# Patient Record
Sex: FEMALE | Race: WHITE | NOT HISPANIC OR LATINO | Employment: FULL TIME | URBAN - METROPOLITAN AREA
[De-identification: names, ages, dates, MRNs, and addresses within clinical notes are randomized per-mention and may not be internally consistent; named-entity substitution may affect disease eponyms.]

---

## 2017-02-03 ENCOUNTER — OFFICE VISIT - RIVER FALLS (OUTPATIENT)
Dept: FAMILY MEDICINE | Facility: CLINIC | Age: 52
End: 2017-02-03

## 2017-02-03 ASSESSMENT — MIFFLIN-ST. JEOR: SCORE: 1300.31

## 2017-07-10 ENCOUNTER — OFFICE VISIT - RIVER FALLS (OUTPATIENT)
Dept: FAMILY MEDICINE | Facility: CLINIC | Age: 52
End: 2017-07-10

## 2017-07-10 ASSESSMENT — MIFFLIN-ST. JEOR: SCORE: 1294.52

## 2017-07-11 LAB
CREAT SERPL-MCNC: 0.89 MG/DL (ref 0.5–1.05)
GLUCOSE BLD-MCNC: 80 MG/DL (ref 65–99)

## 2017-12-12 ENCOUNTER — RECORDS - HEALTHEAST (OUTPATIENT)
Dept: ADMINISTRATIVE | Facility: OTHER | Age: 52
End: 2017-12-12

## 2017-12-18 ENCOUNTER — HOSPITAL ENCOUNTER (OUTPATIENT)
Dept: RADIOLOGY | Facility: CLINIC | Age: 52
Discharge: HOME | End: 2017-12-18
Attending: COLON & RECTAL SURGERY

## 2017-12-18 DIAGNOSIS — K59.00 CONSTIPATION: ICD-10-CM

## 2017-12-20 ENCOUNTER — HOSPITAL ENCOUNTER (OUTPATIENT)
Dept: RADIOLOGY | Facility: CLINIC | Age: 52
Discharge: HOME | End: 2017-12-20
Attending: COLON & RECTAL SURGERY

## 2017-12-20 DIAGNOSIS — K59.00 CONSTIPATION: ICD-10-CM

## 2017-12-22 ENCOUNTER — HOSPITAL ENCOUNTER (OUTPATIENT)
Dept: RADIOLOGY | Facility: CLINIC | Age: 52
Discharge: HOME | End: 2017-12-22
Attending: COLON & RECTAL SURGERY

## 2017-12-22 DIAGNOSIS — K59.00 CONSTIPATION: ICD-10-CM

## 2018-07-31 ENCOUNTER — OFFICE VISIT - RIVER FALLS (OUTPATIENT)
Dept: FAMILY MEDICINE | Facility: CLINIC | Age: 53
End: 2018-07-31

## 2018-08-07 ENCOUNTER — OFFICE VISIT - RIVER FALLS (OUTPATIENT)
Dept: FAMILY MEDICINE | Facility: CLINIC | Age: 53
End: 2018-08-07

## 2018-08-07 ASSESSMENT — MIFFLIN-ST. JEOR: SCORE: 1309.95

## 2018-11-12 ENCOUNTER — TRANSFERRED RECORDS (OUTPATIENT)
Dept: HEALTH INFORMATION MANAGEMENT | Facility: CLINIC | Age: 53
End: 2018-11-12

## 2018-12-18 ENCOUNTER — OFFICE VISIT - RIVER FALLS (OUTPATIENT)
Dept: FAMILY MEDICINE | Facility: CLINIC | Age: 53
End: 2018-12-18

## 2018-12-18 ASSESSMENT — MIFFLIN-ST. JEOR: SCORE: 1330.81

## 2019-07-30 ENCOUNTER — TRANSFERRED RECORDS (OUTPATIENT)
Dept: HEALTH INFORMATION MANAGEMENT | Facility: CLINIC | Age: 54
End: 2019-07-30

## 2020-08-10 ENCOUNTER — TRANSFERRED RECORDS (OUTPATIENT)
Dept: HEALTH INFORMATION MANAGEMENT | Facility: CLINIC | Age: 55
End: 2020-08-10

## 2020-11-14 ENCOUNTER — TRANSFERRED RECORDS (OUTPATIENT)
Dept: HEALTH INFORMATION MANAGEMENT | Facility: CLINIC | Age: 55
End: 2020-11-14

## 2021-07-02 ENCOUNTER — TRANSFERRED RECORDS (OUTPATIENT)
Dept: HEALTH INFORMATION MANAGEMENT | Facility: CLINIC | Age: 56
End: 2021-07-02

## 2021-07-12 ENCOUNTER — TRANSFERRED RECORDS (OUTPATIENT)
Dept: HEALTH INFORMATION MANAGEMENT | Facility: CLINIC | Age: 56
End: 2021-07-12

## 2021-07-26 ENCOUNTER — TRANSFERRED RECORDS (OUTPATIENT)
Dept: HEALTH INFORMATION MANAGEMENT | Facility: CLINIC | Age: 56
End: 2021-07-26

## 2021-11-23 ENCOUNTER — TRANSFERRED RECORDS (OUTPATIENT)
Dept: HEALTH INFORMATION MANAGEMENT | Facility: CLINIC | Age: 56
End: 2021-11-23

## 2021-12-07 ENCOUNTER — TRANSFERRED RECORDS (OUTPATIENT)
Dept: HEALTH INFORMATION MANAGEMENT | Facility: CLINIC | Age: 56
End: 2021-12-07

## 2022-02-11 VITALS — BODY MASS INDEX: 28.95 KG/M2 | TEMPERATURE: 98.3 F | WEIGHT: 163.4 LBS | HEIGHT: 63 IN

## 2022-02-12 VITALS
HEIGHT: 63 IN | SYSTOLIC BLOOD PRESSURE: 116 MMHG | DIASTOLIC BLOOD PRESSURE: 70 MMHG | BODY MASS INDEX: 28.88 KG/M2 | HEART RATE: 96 BPM | OXYGEN SATURATION: 97 % | WEIGHT: 163 LBS | TEMPERATURE: 98.2 F

## 2022-02-12 VITALS
HEART RATE: 66 BPM | DIASTOLIC BLOOD PRESSURE: 60 MMHG | TEMPERATURE: 98.5 F | SYSTOLIC BLOOD PRESSURE: 108 MMHG | WEIGHT: 166.4 LBS | BODY MASS INDEX: 29.48 KG/M2 | HEIGHT: 63 IN

## 2022-02-12 VITALS
BODY MASS INDEX: 30.3 KG/M2 | HEART RATE: 68 BPM | SYSTOLIC BLOOD PRESSURE: 110 MMHG | WEIGHT: 171 LBS | DIASTOLIC BLOOD PRESSURE: 62 MMHG | HEIGHT: 63 IN | TEMPERATURE: 98.3 F

## 2022-02-16 NOTE — TELEPHONE ENCOUNTER
Entered by Ramonita Rosas CMA on October 01, 2019 1:27:14 PM CDT  ---------------------  From: Ramonita Rosas CMA   To: [facility] #[id number]    Sent: 10/1/2019 1:27:14 PM CDT  Subject: Medication Management     ** Not Approved: Patient needs appointment **  zolpidem (ZOLPIDEM 10MG TABLETS)  TAKE 1 TABLET BY MOUTH AT BEDTIME  Qty:  30 tab(s)        Days Supply:  30        Refills:  0          Substitutions Allowed     Route To Pharmacy - [facility] #[id number]   Signed by Ramonita Rosas CMA            ------------------------------------------  From: [employer] #[id number]  To: Aaron Hernandez MD  Sent: October 1, 2019 1:11:06 PM CDT  Subject: Medication Management  Due: October 2, 2019 1:11:06 PM CDT    ** On Hold Pending Signature **  Drug: zolpidem (zolpidem 10 mg oral tablet)  1 TAB(S) ORAL HS  Quantity: 30 tab(s)     Days Supply: 0         Refills: 0  Substitutions Allowed  Notes from Pharmacy:     Dispensed Drug: zolpidem (zolpidem 10 mg oral tablet)  TAKE 1 TABLET BY MOUTH AT BEDTIME  Quantity: 30 tab(s)     Days Supply: 30        Refills: 0  Substitutions Allowed  Notes from Pharmacy:   ------------------------------------------Med Refill    Date of last office visit and reason:  12/2018      Date of last Med Check / Px:   12/2018  Date of last labs pertaining to med:  n/a    RTC order in chart:  yes; pt was to f/u in July when she was given the last 30 day supply. She is living in [place]. Will need an apt for more refills.     For Protocol refill, has patient been contacted:  n/a

## 2022-02-16 NOTE — TELEPHONE ENCOUNTER
---------------------  From: Ghada Shirley CMA (Phone Messages [employer] ([id number])   To: Aaron Hernandez MD;     Sent: 2/19/2019 3:05:11 PM CST  Subject: med request     Phone Message    PCP:    MARY      Time of Call:  2:48pm       Person Calling:  Yolande  Phone number:  [phone]    Returned call at: 2:53pm    Note:  Patient called asking for med refills on Zolpidem and Cyclobenzaprine. Both are PRN medications, she is not currently out of them but patient has moved to [place] and would like remaining refills on file. Please send.     Pt aware [facility] out of clinic until Monday 2/25/19.    Last office visit and reason:  12/18/18 med refillsPlease find out what pharmacy to send them to. Thanks---------------------  From: Aaron Hernandez MD   To: [employer] ([id number]);     Sent: 2/25/2019 9:11:24 AM CST  Subject: RE: med requestLMTCB---------------------  From: Ramonita Rosas CMA ([employer] ([id number]))   To: Aaron Hernandez MD;     Sent: 2/27/2019 8:43:28 AM CST  Subject: RE: med request     Returned Call  Time: 8:40 am  Note:  Called & spoke with pt and she confirmed that the pharmacy listed in her chart is correct. Please advise on refills.  ** Submitted: **  Order:cyclobenzaprine (cyclobenzaprine 10 mg oral tablet)  See Instructions  Take one tablet by mouth three times daily as needed  for muscle spasm  Qty:  30 tab(s)        Refills:  4          [id number]    Signed by Aaron Hernandez MD  2/27/2019 8:44:00 AM    ** Submitted: **  Order:zolpidem (zolpidem 10 mg oral tablet)  1 tab(s)  po  hs  Qty:  90 tab(s)        Refills:  1          Route To Pharmacy - [id number]    Signed by Aaron Hernandez MD  2/27/2019 8:44:00 AM---------------------  From: Aaron Hernandez MD   To: [facility] [employer] ([id number]);     Sent: 2/27/2019 8:45:28 AM CST  Subject: RE: med requestnoted.
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
## 2022-02-16 NOTE — TELEPHONE ENCOUNTER
Entered by Keiry Lagos MA on April 08, 2019 3:17:56 PM CDT  ---------------------  From: Keiry Lagos MA   To: [id number]    Sent: 4/8/2019 3:17:56 PM CDT  Subject: Medication Management     ** Not Approved: Patient should contact Prescriber first **  cyclobenzaprine (CYCLOBENZAPRINE 10MG TABLETS)  TAKE ONE TABLET BY MOUTH THREE TIMES DAILY AS NEEDED FOR MUSCLE SPASM  Qty:  30 tab(s)        Days Supply:  10        Refills:  0          Substitutions Allowed     Route To Pharmacy - [id number]   Signed by Keiry Lagos MA              ** Patient matched by Keiry Lagos MA on 4/8/2019 9:13:38 AM CDT **     ------------------------------------------  From: [id number]  To: Aaron Hernandez MD  Sent: April 7, 2019 12:02:34 PM CDT  Subject: Medication Management  Due: April 8, 2019 12:02:34 PM CDT    ** On Hold Pending Signature **  Drug: cyclobenzaprine (cyclobenzaprine 10 mg oral tablet)  TAKE ONE TABLET BY MOUTH THREE TIMES DAILY AS NEEDED FOR MUSCLE SPASM  Quantity: 30 tab(s)     Days Supply: 10        Refills: 0  Substitutions Allowed  Notes from Pharmacy:     Dispensed Drug: cyclobenzaprine (cyclobenzaprine 10 mg oral tablet)  TAKE ONE TABLET BY MOUTH THREE TIMES DAILY AS NEEDED FOR MUSCLE SPASM  Quantity: 30 tab(s)     Days Supply: 10        Refills: 0  Substitutions Allowed  Notes from Pharmacy:   ------------------------------------------Called and left message asking patient to call us back. She now lives in [place].

## 2022-02-16 NOTE — PROGRESS NOTES
Patient:   CULLEN NEGRO            MRN: [id number]            FIN: [id number]               Age:   51 years     Sex:  Female     :  1965   Associated Diagnoses:   None   Author:   Gerardo Moreno MD      Chief Complaint   2/3/2017 1:43 PM CST     pt here for chronic sinusitis, not improving on antibiotics, has been on two z-paks and now is on Augmentin for 14 days, feels some improvement yet feels not completely gone and antibiotics are getting hard on her stomach        History of Present Illness   Asked to see by Dr. Hernandez for evalaution of sinusitis.  Patient reports that she has been on meds for 38 days.  Her symptoms are better but it is hard on her stomach.  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  Headache on the right side.  Pain in upper back teeth.  Constant nasal drainage and stuffy nose.  Headaches aren't as bad on the antibiotics.  She doesn't feel good.        Review of Systems   Constitutional:  Negative.    Ear/Nose/Mouth/Throat:  Negative except as documented in history of present illness.    Respiratory:  Negative.       Health Status   Allergies:    Allergic Reactions (Selected)  Severity Not Documented  Avelox (Headache)  Levaquin (Nausea and vomiting)  TraMADol (Nausea, headaches)  Vicodin (Itching)   Medications:  (Selected)   Prescriptions  Prescribed  Alphaquin HP 4% topical cream: 1 silver, top, bid, PRN: as needed for skin, # 30 gm, 5 Refill(s), Type: Maintenance, [id number], 1 silver top bid  Augmentin 875 mg oral tablet: 1 tab(s), PO, q12hr, # 28 tab(s), 0 Refill(s), Type: Maintenance, Pharmacy: [facility] #[id number], 1 tab(s) po q12 hrs,x14 day(s)  Cymbalta 60 mg oral delayed release capsule: 1 cap(s) ( 60 mg ), po, bid, # 60 tab(s), 5 Refill(s), Pharmacy: [facility] #[id number], 1 cap(s) po bid  cyclobenzaprine 10 mg oral tablet: See Instructions, Instructions: TAKE 1 TABLET BY MOUTH 3 TIMES DAILY AS NEEDED FOR SPASM, # 30 tab(s), 1 Refill(s), Type: Soft Stop,  Pharmacy: [facility] #[id number]  estradiol 2 mg oral tablet: 1 tab(s) ( 2 mg ), PO, Daily, # 90 tab(s), 3 Refill(s), Type: Maintenance, Pharmacy: [facility] #[id number], 1 tab(s) po daily  oxycodone 5 mg oral tablet: 1 tab(s) ( 5 mg ), PO, q4 hrs, PRN: for pain, # 40 tab(s), 0 Refill(s), Type: Maintenance  zolpidem 10 mg oral tablet: 1 tab(s) ( 10 mg ), PO, Once a day (at bedtime), PRN: for sleep, # 30 tab(s), 5 Refill(s), Type: Maintenance, called to pharmacy (Rx)  Documented Medications  Documented  Zantac: See Instructions, PRN: for control of stomach acid, 0 Refill(s), Type: Maintenance  multivitamin additive: daily, Instructions: Centrum, tab(s), 0 Refill(s), Type: Maintenance   Problem list:    All Problems (Selected)  Backache / SNOMED CT 042302763 / Confirmed  Chronic sinusitis / SNOMED CT 33063393 / Confirmed  Eating disorder / SNOMED CT 128496966 / Confirmed  Anxiety Disorder / SNOMED CT 90812809 / Confirmed  HPV in female / SNOMED CT 833263038 / Confirmed  Tobacco Use Disorder, Continuous / ICD-9-.1 / Confirmed      Histories   Past Medical History:    Active  Backache (514486828): Onset on 7/28/2008 at 42 years.  Comments:  1/16/2014 CST 11:56 AM CST - Yolande Kline.  Anxiety Disorder (99101185)  Tobacco Use Disorder, Continuous (305.1)  HPV in female (591614131)  Eating disorder (140207640)  Chronic sinusitis (74294915)  Resolved  Uterine leiomyoma (919946646): Onset in 1995 at 29 years.  Resolved.   Family History:    Diabetes mellitus  Mother  [place] - Cancer  Father     Procedure history:    Laparoscopy with lysis of adhesions (07633987) on 1/22/2014 at 48 Years.  Comments:  11/21/2014 2:27 PM - Ela Zayas  Bilateral salpingoophorectomy.  Hysterectomy (012779732) in 1995 at 30 Years.  Childbirth (2387236615).  Comments:  10/1/2010 11:21 AM - Bernie Harmon  Para 3      Physical Examination   Vital Signs   2/3/2017 1:43 PM CST     Temperature Tympanic      98.3 DegF     Measurements from  flowsheet : Measurements   2/3/2017 1:43 PM CST Height Measured - Standard 63 in    Weight Measured - Standard 163.4 lb    BSA 1.81 m2    Body Mass Index 28.94 kg/m2      CONSTITUTIONAL  General Appearance:  Normal, well developed, well nourished, no obvious distress  Ability to Communicate:  communicates appropriately.    HEAD AND FACE  Appearance and Symmetry:  Normal, no scalp or facial scarring or suspicious lesions.  Paranasal sinuses tenderness:  Normal, Paranasal sinuses non tender    EARS  Clinical speech reception threshold:  Normal, able to hear normal speech.  Auricle:  Normal, Auricles without scars, lesions, masses.  External auditory canal:  Normal, External auditory canal normal.  Tympanic membrane:  Normal, Tympanic membranes normal without swelling or erythema.  Tympanic membrane mobility:  Normal, Normal tympanic membrane mobility.    NOSE (speculum or scope)  Architecture:  Normal, Grossly normal external nasal architecture with no masses or lesions.  Mucosa:  Normal mucosa, No polyps or masses.  Septum:  Normal, Septum non-obstructing.  Turbinates:  Normal, No turbinate abnormalities    ORAL CAVITY AND OROPHARYNX  Lips:  Normal.  Dental and gingiva:  Normal, No obvious dental or gingival disease.  Mucosa:  Normal, Moist mucous membranes.  Tongue:  Normal, Tongue mobile with no mucosal abnormalities  Hard and soft palate:  Normal, Hard and soft palate without cleft or mucosal lesions.  Oral pharynx:  Normal, Posterior pharynx without lesions or remarkable asymmetry.  Saliva:  Normal, Clear saliva.  Masses:  Normal, No palpable masses or pathologically enlarged lymph nodes.    NECK  Masses/lymph nodes:  Normal, No worrisome neck masses or lymph nodes.  Salivary glands:  Normal, Parotid and submandibular glands.  Trachea and larynx position:  Normal, Trachea and larynx midline.  Thyroid:  Normal, No thyroid abnormality.  Tenderness:  Normal, No cervical tenderness.  Suppleness:  Normal, Neck  supple    NEUROLOGICAL  Speech pattern:  Normal, Proasaic    RESPIRATORY  Symmetry and Respiratory effort:  Normal, Symmetric chest movement and expansion with no increased intercostal retractions or use of accessory muscles.     CT SINUS  I reviewed the images with her and it shows some membrane thickening in the anterior ethmoids and mild air fluid level in the left maxillary sinus.        Impression and Plan   Recurrent acute pansinusitis.  It appears to be resolved.  I discussed endoscopic sinus surgery.  She is not interested in this at this time.  I advised the she use fluticasone to help reduce inflammaion and reduce the likelihood of getting an infection.

## 2022-02-16 NOTE — PROGRESS NOTES
Patient:   CULLEN NEGRO            MRN: [id number]            FIN: [id number]               Age:   51 years     Sex:  Female     :  1965   Associated Diagnoses:   Well adult exam; Blood in stool; Fatigue   Author:   Aaron Hernandez MD      Visit Information   Visit type:  Annual exam.    Source of history:  Self.    History limitation:  None.       Chief Complaint   7/10/2017 4:09 PM CDT    Patient here for physical. Patient has multiple concerns including GI upset, changes with stool, blood in stool, abdominal pain.      Well Adult History   Well Adult History             The patient presents for well adult exam.  The patient's general health status is described as fair.  Patient has been having increased issues with her bowels again. Noting more diarrhea. Intermittent blood in stool. No fevers. Feels bloated and uncomfortable. Poor appetite. Reviewed past workup. Was referred to gastroenterology in . Had workup including EGD, colonoscopy, and labs. Testing were all normal. Had some luck with changing diet but that is not working anymore. Patient reports that she has discovered that Crohns disease runs in her family. Worried about possible causes for symptoms. Frustrated that symptoms have been diffcult to control. .  The patient's diet is described as balanced and changing diet not helping anymore, still working at diet.  Associated symptoms consist of none.  Last menstrual period: patient no longer menstruates due to hysterectomy.  Medical encounters: none.        Review of Systems   ROS reviewed as documented in chart      Health Status   Allergies:    Allergic Reactions (Selected)  Severity Not Documented  Avelox (Headache)  Levaquin (Nausea and vomiting)  TraMADol (Nausea, headaches)  Vicodin (Itching)   Medications:  (Selected)   Prescriptions  Prescribed  Alphaquin HP 4% topical cream: 1 silver, top, bid, PRN: as needed for skin, # 30 gm, 5 Refill(s), Type: Maintenance, Pharmacy: [facility]  #[id number], 1 silver top bid  Augmentin 875 mg oral tablet: 1 tab(s), PO, q12hr, # 28 tab(s), 0 Refill(s), Type: Maintenance, Pharmacy: [facility] #[id number], 1 tab(s) po q12 hrs,x14 day(s)  DULoxetine 60 mg oral delayed release capsule: 1 cap(s) ( 60 mg ), po, bid, # 60 cap(s), 0 Refill(s), Type: Maintenance, Pharmacy: [facility] #[id number], Pt needs appt for further refills  cyclobenzaprine 10 mg oral tablet: See Instructions, Instructions: TAKE 1 TABLET BY MOUTH 3 TIMES DAILY AS NEEDED FOR SPASM, # 30 tab(s), 2 Refill(s), Type: Soft Stop, Pharmacy: [facility] #[id number]  estradiol 2 mg oral tablet: 1 tab(s) ( 2 mg ), PO, Daily, # 90 tab(s), 3 Refill(s), Type: Maintenance, Pharmacy: [facility] #[id number], 1 tab(s) po daily  fluticasone 50 mcg/inh nasal spray: 2 spray(s), nasal, daily, # 1 EA, 11 Refill(s), Type: Maintenance, Pharmacy: [facility] #[id number], 2 spray(s) nasal daily,x30 day(s)  oxycodone 5 mg oral tablet: 1 tab(s) ( 5 mg ), PO, q4 hrs, PRN: for pain, # 40 tab(s), 0 Refill(s), Type: Maintenance  zolpidem 10 mg oral tablet: See Instructions, Instructions: TAKE ONE TABLET NIGHTLY AT BEDTIME AS NEEDED FOR SLEEP, # 30 tab(s), 4 Refill(s), Type: Soft Stop, Pharmacy: [facility] #[id number], TAKE ONE TABLET NIGHTLY AT BEDTIME AS NEEDED FOR SLEEP  Documented Medications  Documented  NexIUM: po, daily, 0 Refill(s), Type: Maintenance  Zantac: See Instructions, PRN: for control of stomach acid, 0 Refill(s), Type: Maintenance  multivitamin additive: daily, Instructions: Centrum, tab(s), 0 Refill(s), Type: Maintenance   Problem list:    All Problems  Backache / SNOMED CT 873743237 / Confirmed  Chronic sinusitis / SNOMED CT 55106216 / Confirmed  Eating disorder / SNOMED CT 586235758 / Confirmed  Anxiety Disorder / SNOMED CT 28809523 / Confirmed  HPV in female / SNOMED CT 008900053 / Confirmed  Tobacco Use Disorder, Continuous / ICD-9-.1 / Confirmed  Resolved: Uterine leiomyoma / SNOMED CT 953924487      Histories    Past Medical History:    Active  Backache (SNOMED CT 603607789): Onset on 7/28/2008 at 42 years.  Comments:  1/16/2014 CST 11:56 AM CST - Yolande Kline.  Anxiety Disorder (SNOMED CT 51382577)  Tobacco Use Disorder, Continuous (ICD-9-.1)  HPV in female (SNOMED CT 849452084)  Eating disorder (SNOMED CT 274577643)  Chronic sinusitis (SNOMED CT 34104909)  Resolved  Uterine leiomyoma (SNOMED CT 269789771): Onset in 1995 at 29 years.  Resolved.   Family History:    Diabetes mellitus  Mother  [place] - Cancer  Father     Procedure history:    Laparoscopy with lysis of adhesions (31042410) on 1/22/2014 at 48 Years.  Comments:  11/21/2014 2:27 PM - Ela Zayas  Bilateral salpingoophorectomy.  Hysterectomy (819872335) in 1995 at 30 Years.  Childbirth (0843719208).  Comments:  10/1/2010 11:21 AM - Bernie Harmon  Para 3   Social History:        Tobacco Assessment: Past            Cigarettes, 10 per day.  20 year(s).  Ready to change: Yes.            Past      Substance Abuse Assessment: Denies Substance Abuse            Never      Employment and Education Assessment            Health Aide      Home and Environment Assessment            Marital status: .  Spouse/Partner name: Fan Hatch.      Nutrition and Health Assessment            Type of diet: Regular.      Exercise and Physical Activity Assessment: Does not exercise            Exercise frequency: ..      Sexual Assessment            Sexually active: Yes.  Sexual orientation: Heterosexual.  Other contraceptive use: Hysterectomy.      Other Assessment            Immunization, Paper chart states patient completed the Hepatitis B series            No history of abnormal Pap smear.        Physical Examination   Vital Signs   7/10/2017 4:09 PM CDT Temperature Temporal 98.2 DegF    Peripheral Pulse Rate 96 bpm    Systolic Blood Pressure 116 mmHg    Diastolic Blood Pressure 70 mmHg    Mean Arterial Pressure 85 mmHg    Oxygen Saturation 97 %      Measurements  from flowsheet : Measurements   7/10/2017 4:09 PM CDT Height Measured - Standard 62.75 in    Weight Measured - Standard 163.0 lb    BSA 1.81 m2    Body Mass Index 29.1 kg/m2      General:  Alert and oriented, No acute distress.    Eye:  Pupils are equal, round and reactive to light, Extraocular movements are intact, Normal conjunctiva.    HENT:  Normocephalic, Tympanic membranes are clear, Oral mucosa is moist, No pharyngeal erythema.    Neck:  Supple, Non-tender, No lymphadenopathy, No thyromegaly.    Respiratory:  Lungs are clear to auscultation.    Cardiovascular:  Normal rate, Regular rhythm.    Breast:  No mass, No tenderness, No discharge.    Gastrointestinal:  Soft, Non-tender, Non-distended, Normal bowel sounds, No organomegaly.    Musculoskeletal:  Normal range of motion, Normal strength.    Integumentary:  Warm, Dry, Pink, No rash, no concerning lesions.    Neurologic:  Alert, Oriented, Normal sensory.    Psychiatric:  Cooperative, Appropriate mood & affect.       Review / Management   Results review   Reviewed colonoscopy, EGD, biopsy results, prior labs      Impression and Plan   Diagnosis     Well adult exam (IWL26-BM Z00.00).     Course:  Progressing as expected.    Patient Instructions:       Counseled: Patient, BMI, diet, and exercise.    Diagnosis     Blood in stool (YOR31-BU K92.1).     Fatigue (KDL22-AT R53.83).     Well adult exam (LXZ67-QP Z00.00).     Course:  Worsening.    Orders     Orders (Selected)   Outpatient Orders  Ordered  Referral (Request): 07/10/17 16:38:00 CDT, Referred to: General Surgery, Referred to: Colon and Rectal surgeons, Reason for referral: blood in stool, ongoing abdominal pain, family hx Crohns, Abdominal pain  Blood in stool  Fatigue  Ordered (In Transit)  CBC (includes diff/plt)* ([facility]): Specimen Type: Blood, Collection Date: 07/10/17 16:38:00 CDT  Comprehensive Metabolic Panel* ([facility]): Specimen Type: Serum, Collection Date: 07/10/17 16:38:00 CDT  Sed rate by  modified mt* ([facility]): Specimen Type: Blood, Collection Date: 07/10/17 16:38:00 CDT  Documented Medications  Documented  NexIUM: po, daily, 0 Refill(s), Type: Maintenance.

## 2022-02-16 NOTE — TELEPHONE ENCOUNTER
---------------------  From: Rachel Gaines CMA (Phone Messages Pool ([id number]_[facility]))   Sent: 7/22/2019 9:07:50 AM CDT  Subject: Zolpidem Rx     Phone message    PCP: MARY    Person calling: Raissa  Phone number: [phone] ok LVM  Time message left: 0845  Return call time: _    Reason: Raissa called and stated that MARY was suppose to send a prescription for Ambien to the [facility] in [place], [place]  she stated that she called them but they told her that they do not have any Rx from the clinic. Informed Raissa that I would contact the clinic and see what was going on.     [facility] they stated that they have the Rx and that the cost is $61.99 out of pocket as they  don't have any insurance for her on file, and they cant contact her either as they don't have a phone number on file. Gave the pharmacy pt number and informed them she has [employer] open access for insurance. Pharmacist stated that they will contact pt and take care of everything and will contact clinic if needed.    Transferred to:             DIANNA Gaines CMA

## 2022-02-16 NOTE — LETTER
(Inserted Image. Unable to display)   August 07, 2019      CULLEN NEGRO  [address]   [place], [place] [id number]        Dear CULLEN,      Thank you for selecting [facility] ([facility] for your healthcare needs.     Our records indicate you are due for the following services:     Annual Physical    To schedule an appointment or if you have further questions, please contact your primary clinic:   [facility]          [phone]   [facility]    [phone]             [facility]         [phone]      Powered by [email or web address]    Sincerely,    Aaron Hernandez M.D.
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
## 2022-06-16 NOTE — PROCEDURES
Accession Number:       [id number]  PATHOLOGIST:     Glenn Goyal Jr., M.D., Board Certified in Anatomic Pathology, Clinical Pathology and Cytopathology, 1 [phone] (electronic signature)  REPORT NOTES:     Key portions of this case have been reviewed by a Board Certified Dermatopathologist.  A SOURCE:     Skin, right upper chest  A GROSS DESCRIPTION:     See comment       Specimen is received in formalin, labeled with       multiple patient identifier(s) and consists of       one piece from a punch skin biopsy measuring 0.4       x 0.4 x 0.4 cm, circular in shape and tan-gray in       color. The margins are inked green. The specimen       is bisected and entirely submitted in one       cassette(s).         Gross exam(s) performed at: [address] [address]         : KRISTAL CRISOSTOMO MD  A DIAGNOSIS:     Leiomyoma with mild cytologic atypia, extending to the biopsy margins. See comment.  A COMMENT:     Immunostain with smooth muscle actin is positive and favors the above diagnosis (all positive and required negative controls stained appropriately). In view of the cytologic atypia, complete excision is recommended.

## 2023-02-03 ENCOUNTER — TRANSFERRED RECORDS (OUTPATIENT)
Dept: HEALTH INFORMATION MANAGEMENT | Facility: CLINIC | Age: 58
End: 2023-02-03

## 2023-07-12 ENCOUNTER — TRANSFERRED RECORDS (OUTPATIENT)
Dept: HEALTH INFORMATION MANAGEMENT | Facility: CLINIC | Age: 58
End: 2023-07-12

## 2023-08-17 ENCOUNTER — TRANSFERRED RECORDS (OUTPATIENT)
Dept: HEALTH INFORMATION MANAGEMENT | Facility: CLINIC | Age: 58
End: 2023-08-17

## 2023-09-10 ENCOUNTER — TRANSFERRED RECORDS (OUTPATIENT)
Dept: MULTI SPECIALTY CLINIC | Facility: CLINIC | Age: 58
End: 2023-09-10

## 2023-10-23 ENCOUNTER — TRANSFERRED RECORDS (OUTPATIENT)
Dept: HEALTH INFORMATION MANAGEMENT | Facility: CLINIC | Age: 58
End: 2023-10-23

## 2024-03-14 ENCOUNTER — OFFICE VISIT (OUTPATIENT)
Dept: FAMILY MEDICINE | Facility: CLINIC | Age: 59
End: 2024-03-14

## 2024-03-14 ENCOUNTER — NURSE TRIAGE (OUTPATIENT)
Dept: NURSING | Facility: CLINIC | Age: 59
End: 2024-03-14

## 2024-03-14 VITALS
OXYGEN SATURATION: 97 % | RESPIRATION RATE: 16 BRPM | TEMPERATURE: 99.1 F | DIASTOLIC BLOOD PRESSURE: 78 MMHG | BODY MASS INDEX: 28.28 KG/M2 | HEART RATE: 105 BPM | HEIGHT: 63 IN | SYSTOLIC BLOOD PRESSURE: 136 MMHG | WEIGHT: 159.6 LBS

## 2024-03-14 DIAGNOSIS — H65.91 OME (OTITIS MEDIA WITH EFFUSION), RIGHT: Primary | ICD-10-CM

## 2024-03-14 PROCEDURE — 99213 OFFICE O/P EST LOW 20 MIN: CPT | Performed by: FAMILY MEDICINE

## 2024-03-14 RX ORDER — ESTRADIOL 2 MG/1
2 TABLET ORAL DAILY
COMMUNITY
End: 2024-04-11

## 2024-03-14 RX ORDER — DULOXETIN HYDROCHLORIDE 60 MG/1
60 CAPSULE, DELAYED RELEASE ORAL DAILY
COMMUNITY
End: 2024-04-11

## 2024-03-14 RX ORDER — CEFDINIR 300 MG/1
300 CAPSULE ORAL 2 TIMES DAILY
Qty: 20 CAPSULE | Refills: 0 | Status: SHIPPED | OUTPATIENT
Start: 2024-03-14 | End: 2024-04-11

## 2024-03-14 ASSESSMENT — PATIENT HEALTH QUESTIONNAIRE - PHQ9
SUM OF ALL RESPONSES TO PHQ QUESTIONS 1-9: 17
10. IF YOU CHECKED OFF ANY PROBLEMS, HOW DIFFICULT HAVE THESE PROBLEMS MADE IT FOR YOU TO DO YOUR WORK, TAKE CARE OF THINGS AT HOME, OR GET ALONG WITH OTHER PEOPLE: SOMEWHAT DIFFICULT
SUM OF ALL RESPONSES TO PHQ QUESTIONS 1-9: 17

## 2024-03-14 NOTE — PROGRESS NOTES
"  Assessment & Plan     OME (otitis media with effusion), right  Will treat with antibiotics as ordered, follow-up if not getting better over the next 2 to 3 days.  - cefdinir (OMNICEF) 300 MG capsule; Take 1 capsule (300 mg) by mouth 2 times daily            BMI  Estimated body mass index is 27.86 kg/m  as calculated from the following:    Height as of this encounter: 1.612 m (5' 3.47\").    Weight as of this encounter: 72.4 kg (159 lb 9.6 oz).       Depression Screening Follow Up        3/14/2024     1:00 PM   PHQ   PHQ-9 Total Score 17   Q9: Thoughts of better off dead/self-harm past 2 weeks Not at all           Follow Up Actions Taken  Crisis resource information provided in After Visit Summary  Follow up recommended: Will discuss at her upcoming visit at her request.            Urbano Lanier is a 58 year old, presenting for the following health issues:  Ear Problem (Right Ear - ) and Headache (X 5 days, with sinus pressure. )      3/14/2024     1:05 PM   Additional Questions   Roomed by Donna PADILLA CMA   Accompanied by None     Via the Health Maintenance questionnaire, the patient has reported the following services have been completed -Mammogram, this information has been sent to the abstraction team.  History of Present Illness       Headaches:   Since the patient's last clinic visit, headaches are: worsened  The patient is getting headaches:  1day a week  She is not able to do normal daily activities when she has a migraine.  The patient is taking the following rescue/relief medications:  Tylenol   Patient states \"The relief is inconsistent\" from the rescue/relief medications.   The patient is taking the following medications to prevent migraines:  No medications to prevent migraines  In the past 4 weeks, the patient has gone to an Urgent Care or Emergency Room 0 times times due to headaches.    Reason for visit:  Earache headache sorethroat  Symptom onset:  3-7 days ago  Symptom intensity:  Severe  Symptom " "progression:  Worsening  Had these symptoms before:  No    She eats 0-1 servings of fruits and vegetables daily.She consumes 1 sweetened beverage(s) daily.She exercises with enough effort to increase her heart rate 20 to 29 minutes per day.  She exercises with enough effort to increase her heart rate 3 or less days per week.   She is taking medications regularly.       Patient with 5 days of progressively worsening sinus pressure, and ear pain  No significant fever  Minimal cough  Sore throat for past 5 days as well                    Objective    /78   Pulse 105   Temp 99.1  F (37.3  C)   Resp 16   Ht 1.612 m (5' 3.47\")   Wt 72.4 kg (159 lb 9.6 oz)   LMP  (LMP Unknown)   SpO2 97%   BMI 27.86 kg/m    Body mass index is 27.86 kg/m .  Physical Exam   GENERAL: alert and no distress  EYES: Eyes grossly normal to inspection, PERRL and conjunctivae and sclerae normal  HENT: normal cephalic/atraumatic, right ear: erythematous, bulging membrane, and mucopurulent effusion, left ear: normal: no effusions, no erythema, normal landmarks, nose and mouth without ulcers or lesions, oropharynx clear, and oral mucous membranes moist  NECK: no adenopathy, no asymmetry, masses, or scars  RESP: lungs clear to auscultation - no rales, rhonchi or wheezes  CV: regular rate and rhythm, normal S1 S2, no S3 or S4, no murmur, click or rub, no peripheral edema             Signed Electronically by: Aaron Hernandez MD    "

## 2024-03-14 NOTE — TELEPHONE ENCOUNTER
Sore throat and plugged ear since Sunday. Right ear, over the counter isn't working.  I connected with scheduling for an appointment and advised urgent care if they can't get her in.  Mali Moscoso RN  [facility]      Reason for Disposition   SEVERE sinus pain     Pain at 9    Additional Information   Negative: Sounds like a life-threatening emergency to the triager   Negative: Difficulty breathing, and not from stuffy nose (e.g., not relieved by cleaning out the nose)   Negative: SEVERE headache and has fever   Negative: Patient sounds very sick or weak to the triager    Protocols used: Sinus Pain or Congestion-A-OH

## 2024-03-18 ENCOUNTER — TELEPHONE (OUTPATIENT)
Dept: FAMILY MEDICINE | Facility: CLINIC | Age: 59
End: 2024-03-18

## 2024-03-18 NOTE — TELEPHONE ENCOUNTER
S-(situation):   Patient has ear pain, headache and continues with sinus pain    B-(background):   Patient seen on 3/14/2024, started on Cefdinir 300 mg BID for 10 days    A-(assessment):   Patient says she has a lot of pain in right ear, cannot hear out of right ear.   Continues with headache and sinus pain    R-(recommendations):   Patient only wanted message sent to provider.   RN did let patient know that typical course of action is that patient would need to come in for an office visit for reassessment if 1 treatment fails. RN will send message to provider but that she may need to come in.     CELINE Kapoor  [facility]  [phone]    [facility]   Monday  - Thursday 7 AM - 6 PM    Friday  7 AM - 5 PM     -Please call your clinic for assistance from a nurse after hours.

## 2024-03-18 NOTE — TELEPHONE ENCOUNTER
Would recommend in addition to continuing the antibiotics that she add nasal saline flushes and pseudoephedrine over-the-counter decongestant that she can buy from the pharmacist to try and promote decongestion and drainage.  I am out of the office the rest of this week but if symptoms are worsening she should see one of my partners or I can work her in my schedule next Monday to reevaluate.

## 2024-04-11 ENCOUNTER — OFFICE VISIT (OUTPATIENT)
Dept: FAMILY MEDICINE | Facility: CLINIC | Age: 59
End: 2024-04-11
Payer: COMMERCIAL

## 2024-04-11 VITALS
BODY MASS INDEX: 28.26 KG/M2 | SYSTOLIC BLOOD PRESSURE: 130 MMHG | WEIGHT: 159.5 LBS | RESPIRATION RATE: 10 BRPM | HEART RATE: 103 BPM | OXYGEN SATURATION: 93 % | HEIGHT: 63 IN | DIASTOLIC BLOOD PRESSURE: 72 MMHG

## 2024-04-11 DIAGNOSIS — R23.2 HOT FLASHES: ICD-10-CM

## 2024-04-11 DIAGNOSIS — F51.01 PRIMARY INSOMNIA: ICD-10-CM

## 2024-04-11 DIAGNOSIS — F41.1 GENERALIZED ANXIETY DISORDER: Primary | ICD-10-CM

## 2024-04-11 PROBLEM — J32.9 CHRONIC SINUSITIS: Status: ACTIVE | Noted: 2024-04-11

## 2024-04-11 PROBLEM — F50.9 EATING DISORDER: Status: ACTIVE | Noted: 2024-04-11

## 2024-04-11 PROBLEM — B97.7 HUMAN PAPILLOMA VIRUS (HPV) INFECTION: Status: ACTIVE | Noted: 2024-04-11

## 2024-04-11 PROCEDURE — 99214 OFFICE O/P EST MOD 30 MIN: CPT | Performed by: FAMILY MEDICINE

## 2024-04-11 RX ORDER — ESTRADIOL 2 MG/1
2 TABLET ORAL DAILY
Qty: 90 TABLET | Refills: 3 | Status: SHIPPED | OUTPATIENT
Start: 2024-04-11

## 2024-04-11 RX ORDER — BUPROPION HYDROCHLORIDE 300 MG/1
300 TABLET ORAL EVERY MORNING
COMMUNITY

## 2024-04-11 RX ORDER — ESZOPICLONE 2 MG/1
2 TABLET, FILM COATED ORAL AT BEDTIME
Qty: 90 TABLET | Refills: 1 | Status: SHIPPED | OUTPATIENT
Start: 2024-04-11 | End: 2024-09-30

## 2024-04-11 RX ORDER — DULOXETIN HYDROCHLORIDE 30 MG/1
30 CAPSULE, DELAYED RELEASE ORAL DAILY
Qty: 90 CAPSULE | Refills: 3 | Status: SHIPPED | OUTPATIENT
Start: 2024-04-11 | End: 2024-09-12 | Stop reason: DRUGHIGH

## 2024-04-11 RX ORDER — ASPIRIN 81 MG/1
81 TABLET ORAL DAILY
COMMUNITY

## 2024-04-11 RX ORDER — OXYBUTYNIN CHLORIDE 10 MG/1
10 TABLET, EXTENDED RELEASE ORAL DAILY
COMMUNITY
End: 2024-04-24

## 2024-04-11 RX ORDER — DULOXETIN HYDROCHLORIDE 60 MG/1
60 CAPSULE, DELAYED RELEASE ORAL DAILY
Qty: 90 CAPSULE | Refills: 3 | Status: SHIPPED | OUTPATIENT
Start: 2024-04-11 | End: 2024-09-12

## 2024-04-11 RX ORDER — ESZOPICLONE 2 MG/1
2 TABLET, FILM COATED ORAL AT BEDTIME
COMMUNITY
End: 2024-04-11

## 2024-04-11 RX ORDER — PANTOPRAZOLE SODIUM 20 MG/1
20 TABLET, DELAYED RELEASE ORAL DAILY
COMMUNITY
End: 2024-08-16

## 2024-04-11 RX ORDER — ATORVASTATIN CALCIUM 20 MG/1
20 TABLET, FILM COATED ORAL DAILY
COMMUNITY
End: 2024-08-22

## 2024-04-11 RX ORDER — DULOXETIN HYDROCHLORIDE 30 MG/1
30 CAPSULE, DELAYED RELEASE ORAL DAILY
COMMUNITY
End: 2024-04-11

## 2024-04-11 SDOH — HEALTH STABILITY: PHYSICAL HEALTH: ON AVERAGE, HOW MANY DAYS PER WEEK DO YOU ENGAGE IN MODERATE TO STRENUOUS EXERCISE (LIKE A BRISK WALK)?: 1 DAY

## 2024-04-11 ASSESSMENT — SOCIAL DETERMINANTS OF HEALTH (SDOH): HOW OFTEN DO YOU GET TOGETHER WITH FRIENDS OR RELATIVES?: ONCE A WEEK

## 2024-04-11 NOTE — PROGRESS NOTES
"  Assessment & Plan     Generalized anxiety disorder  Exacerbated currently due to impending divorce, mild depression, declines counseling at this time, will continue medication and let me know if she wants referral in the future, continues bupropion, has adequate supply at this time  - DULoxetine (CYMBALTA) 30 MG capsule; Take 1 capsule (30 mg) by mouth daily  - DULoxetine (CYMBALTA) 60 MG capsule; Take 1 capsule (60 mg) by mouth daily    Hot flashes  Persistent hot flashes, estradiol continues to be effective  - estradiol (ESTRACE) 2 MG tablet; Take 1 tablet (2 mg) by mouth daily    Primary insomnia  Persistent issues, overall controlled on current regimen, tolerating without side effects  - eszopiclone (LUNESTA) 2 MG tablet; Take 1 tablet (2 mg) by mouth at bedtime              BMI  Estimated body mass index is 28.55 kg/m  as calculated from the following:    Height as of this encounter: 1.592 m (5' 2.68\").    Weight as of this encounter: 72.3 kg (159 lb 8 oz).       Depression Screening Follow Up        4/11/2024     1:54 PM   PHQ   PHQ-9 Total Score 17   Q9: Thoughts of better off dead/self-harm past 2 weeks Several days   F/U: Thoughts of suicide or self-harm No   F/U: Safety concerns No                     Follow Up Actions Taken  Crisis resource information provided in the After Visit Summary  Patient declined referral.    Discussed the following ways the patient can remain in a safe environment:   reach out if any worsening  Counseling  Appropriate preventive services were discussed with this patient, including applicable screening as appropriate for fall prevention, nutrition, physical activity, Tobacco-use cessation, weight loss and cognition.  Checklist reviewing preventive services available has been given to the patient.  Reviewed patient's diet, addressing concerns and/or questions.   She is at risk for lack of exercise and has been provided with information to increase physical activity for the " "benefit of her well-being.   The patient's PHQ-9 score is consistent with moderate depression. She was provided with information regarding depression.           Urbano Lanier is a 58 year old, presenting for the following health issues:  Derm Problem (Right Arm - ), Dizziness (Right Ear - still causing issues. ), Urinary Problem (Needs medication refilled - ), and Pain (Arthritis - )      4/11/2024     2:00 PM   Additional Questions   Roomed by Donna PADILLA CMA   Accompanied by None       HPI:  Patient here for follow up and refills  Has returned home from moving to [place], going through a divorce, has some days where she feels like it would be easier to not be alive but has not had suicidal thoughts. Divorce has been contentious. She is living in the basement of a friends home. Frustrated with what she had given up. Insomnia overall controlled by current regimen   Notes that she was started on oxybutinin in [place]. Also still taking estradiol which has been effective for hot flash symptoms.   Still having pressure in the ears.  Pain    History of Present Illness       Reason for visit:  Evaluate medications reconnect ear issues pain knee hip armbladderShe exercises with enough effort to increase her heart rate 10 to 19 minutes per day.  She exercises with enough effort to increase her heart rate 3 or less days per week.   She is taking medications regularly.                     Objective    /72   Pulse 103   Resp 10   Ht 1.592 m (5' 2.68\")   Wt 72.3 kg (159 lb 8 oz)   LMP  (LMP Unknown)   SpO2 93%   BMI 28.55 kg/m    Body mass index is 28.55 kg/m .  Physical Exam     Alert and cooperative, no distress  Tearfulness discussing circumstance            Signed Electronically by: Aaron Hernandez MD    "

## 2024-04-15 ENCOUNTER — TELEPHONE (OUTPATIENT)
Dept: FAMILY MEDICINE | Facility: CLINIC | Age: 59
End: 2024-04-15
Payer: COMMERCIAL

## 2024-04-15 DIAGNOSIS — N39.46 MIXED INCONTINENCE: Primary | ICD-10-CM

## 2024-04-15 NOTE — TELEPHONE ENCOUNTER
S-(situation): Patient calling on ear problems.     B-(background): Patient was seen 4/11/24, for anxiety but she forgot to mention that she was still having trouble with her ear. 3/14/24, she was seen for ear infection and sinus infection. Was given antibiotics that were finished, and she then she was also advised to try sudafed which did not help.    A-(assessment): Patient still has a plugged ear.    R-(recommendations): Would like message routed to Dr. Hernandez to advise on what she should do for her ear that is still plugged. Please review and advise.      From OV 3/14/24.

## 2024-04-15 NOTE — TELEPHONE ENCOUNTER
Please see if she has tried flushing her nasal passages with nasal saline or using over-the-counter nasal steroid such as Flonase.  Sometimes if you can get the eustachian tube to drain better the symptoms will get better.  If not resolving I am happy to see her back in clinic or we could refer her to an ear nose and throat specialist for second opinion

## 2024-04-15 NOTE — TELEPHONE ENCOUNTER
RN called patient back with message below from Dr. Hernandez. She has tried nasal saline, and neti pots, this does not give her relief. She is agreeable to coming in for an appointment with Dr. Hernandez. Patient needs appointment after 3 pm. RN scheduled her first available after this time- 4/24/24. Patient said she will discuss referral at this time after Dr. Hernandez looks at ear.

## 2024-04-15 NOTE — TELEPHONE ENCOUNTER
4-15-24    FYI - Status Update    Who is Calling: patient    Update: pt was seen 4-11 & stated she needed a refill on:  oxyBUTYnin ER (DITROPAN XL)   But pt couldn't remember the MG on 4-11 appt date,(pt was getting this filled this at a doctor in [place]) pt is now calling back with correct MG, pt is on 15MG.  Pt is needing a refill, pt uses [facility] in [place] for her pharmacy.    Does caller want a call/response back: No

## 2024-04-16 RX ORDER — OXYBUTYNIN CHLORIDE 15 MG/1
15 TABLET, EXTENDED RELEASE ORAL DAILY
Qty: 90 TABLET | Refills: 4 | Status: SHIPPED | OUTPATIENT
Start: 2024-04-16 | End: 2024-04-24

## 2024-04-24 ENCOUNTER — OFFICE VISIT (OUTPATIENT)
Dept: FAMILY MEDICINE | Facility: CLINIC | Age: 59
End: 2024-04-24
Payer: COMMERCIAL

## 2024-04-24 VITALS
DIASTOLIC BLOOD PRESSURE: 84 MMHG | OXYGEN SATURATION: 94 % | SYSTOLIC BLOOD PRESSURE: 128 MMHG | WEIGHT: 159.39 LBS | BODY MASS INDEX: 28.53 KG/M2 | HEART RATE: 94 BPM | RESPIRATION RATE: 12 BRPM

## 2024-04-24 DIAGNOSIS — Z13.1 SCREENING FOR DIABETES MELLITUS: ICD-10-CM

## 2024-04-24 DIAGNOSIS — Z13.6 SCREENING FOR CARDIOVASCULAR CONDITION: ICD-10-CM

## 2024-04-24 DIAGNOSIS — N39.46 MIXED INCONTINENCE: ICD-10-CM

## 2024-04-24 DIAGNOSIS — H65.21 RIGHT CHRONIC SEROUS OTITIS MEDIA: Primary | ICD-10-CM

## 2024-04-24 PROCEDURE — 99213 OFFICE O/P EST LOW 20 MIN: CPT | Performed by: FAMILY MEDICINE

## 2024-04-24 RX ORDER — OXYBUTYNIN CHLORIDE 15 MG/1
15 TABLET, EXTENDED RELEASE ORAL DAILY
Qty: 90 TABLET | Refills: 4 | Status: SHIPPED | OUTPATIENT
Start: 2024-04-24

## 2024-04-24 NOTE — PROGRESS NOTES
"  Assessment & Plan     Right chronic serous otitis media  Persistent dullness of the right eardrum.  This has been a problem for the patient for quite some time.  Will refer her to ENT for further evaluation.  - Adult ENT  Referral; Future    Mixed incontinence  Oxybutynin sent to the pharmacy and staff confirmed that it is available for pickup.  Patient will go to the pharmacy and let us know if ongoing concerns.  - oxyBUTYnin ER (DITROPAN XL) 15 MG 24 hr tablet; Take 1 tablet (15 mg) by mouth daily    Screening for diabetes mellitus  Return for fasting labs  - Glucose; Future    Screening for cardiovascular condition  Return for fasting labs  - Lipid panel reflex to direct LDL Non-fasting; Future            BMI  Estimated body mass index is 28.53 kg/m  as calculated from the following:    Height as of 4/11/24: 1.592 m (5' 2.68\").    Weight as of this encounter: 72.3 kg (159 lb 6.3 oz).             Urbano Lanier is a 58 year old, presenting for the following health issues:  Derm Problem (Right Arm - Bicep Area), Ear Problem (W/ Dizziness - still having symptoms. ), Depression (Not much better - ), Pain, and Weight Problem (Wanting Injection or Oral Medication - )        4/11/2024     2:00 PM   Additional Questions   Roomed by Donna PADILLA CMA   Accompanied by None     Ear Problem    Musculoskeletal Problem  Ongoing ear fullness in right ear, previously had an infection in that ear but even after it resolved it did not fully go away.  No drainage.  Not painful.  Hearing is impaired on that side.  Feels like it should pop but does not.  Has tried decongestants without effect.  2. Brown spot on inner upper arm on right, recently noticed.  Does not bother her.  3.  Notes that depression continues to be an issue.  She is frustrated with the proceedings of the divorce noting that her mediation is not scheduled till August and she is concerned about her finances until that time.  She is very tired and working " long hours.  Declines counseling at this time.  On maximum dose of duloxetine and bupropion.                  Objective    /84   Pulse 94   Resp 12   Wt 72.3 kg (159 lb 6.3 oz)   LMP  (LMP Unknown)   SpO2 94%   BMI 28.53 kg/m    Body mass index is 28.53 kg/m .  Physical Exam               Signed Electronically by: Aaron Hernandez MD

## 2024-04-26 ENCOUNTER — TELEPHONE (OUTPATIENT)
Dept: FAMILY MEDICINE | Facility: CLINIC | Age: 59
End: 2024-04-26
Payer: COMMERCIAL

## 2024-04-26 DIAGNOSIS — H65.21 RIGHT CHRONIC SEROUS OTITIS MEDIA: Primary | ICD-10-CM

## 2024-04-26 NOTE — TELEPHONE ENCOUNTER
General Call      Reason for Call:  ENT Referral    What are your questions or concerns:  Patient calling because the ENT doctor that provider referred her to can't see her for a year. Patient is wondering if there is someone else she can refer her to.     Date of last appointment with provider: 04/24/24    Okay to leave a detailed message?: Yes at Home number on file [phone] (home)

## 2024-04-26 NOTE — TELEPHONE ENCOUNTER
I placed an order and referred to [facility]. If that isn't sooner, I would recommend that she check with her insurance and see what other options are covered. Happy to refer her anywhere.

## 2024-08-16 DIAGNOSIS — K21.00 GASTROESOPHAGEAL REFLUX DISEASE WITH ESOPHAGITIS, UNSPECIFIED WHETHER HEMORRHAGE: Primary | ICD-10-CM

## 2024-08-16 RX ORDER — PANTOPRAZOLE SODIUM 20 MG/1
20 TABLET, DELAYED RELEASE ORAL DAILY
Qty: 90 TABLET | Refills: 0 | Status: SHIPPED | OUTPATIENT
Start: 2024-08-16

## 2024-08-16 NOTE — TELEPHONE ENCOUNTER
Medication Question or Refill    Contacts       Contact Date/Time Type Contact Phone/Fax    08/16/2024 10:32 AM CDT Phone (Incoming) Yolande Clarke (Self) [phone] (M)            What medication are you calling about (include dose and sig)?: pantoprazole (PROTONIX) 20 MG EC tablet     Preferred Pharmacy:   [id number] [address]  [address] [address]  Phone: [phone] Fax: [phone]      Controlled Substance Agreement on file:   CSA -- Patient Level:    CSA: None found at the patient level.       Who prescribed the medication?: PCP    Do you need a refill? Yes    When did you use the medication last? 8.16.24    Patient offered an appointment? No    Do you have any questions or concerns?  No      Okay to leave a detailed message?: Yes at Cell number on file:    Telephone Information:   Mobile [phone]

## 2024-08-22 DIAGNOSIS — E78.5 HYPERLIPIDEMIA, UNSPECIFIED HYPERLIPIDEMIA TYPE: Primary | ICD-10-CM

## 2024-08-22 RX ORDER — ATORVASTATIN CALCIUM 20 MG/1
20 TABLET, FILM COATED ORAL DAILY
Qty: 90 TABLET | Refills: 1 | Status: SHIPPED | OUTPATIENT
Start: 2024-08-22

## 2024-08-23 ENCOUNTER — OFFICE VISIT (OUTPATIENT)
Dept: FAMILY MEDICINE | Facility: CLINIC | Age: 59
End: 2024-08-23
Payer: COMMERCIAL

## 2024-08-23 ENCOUNTER — ANCILLARY PROCEDURE (OUTPATIENT)
Dept: GENERAL RADIOLOGY | Facility: CLINIC | Age: 59
End: 2024-08-23
Attending: NURSE PRACTITIONER
Payer: COMMERCIAL

## 2024-08-23 VITALS
OXYGEN SATURATION: 95 % | SYSTOLIC BLOOD PRESSURE: 115 MMHG | HEART RATE: 92 BPM | BODY MASS INDEX: 28.9 KG/M2 | HEIGHT: 63 IN | RESPIRATION RATE: 18 BRPM | WEIGHT: 163.1 LBS | DIASTOLIC BLOOD PRESSURE: 76 MMHG | TEMPERATURE: 98.8 F

## 2024-08-23 DIAGNOSIS — M25.561 CHRONIC PAIN OF BOTH KNEES: ICD-10-CM

## 2024-08-23 DIAGNOSIS — M25.562 CHRONIC PAIN OF BOTH KNEES: ICD-10-CM

## 2024-08-23 DIAGNOSIS — M25.562 ACUTE PAIN OF LEFT KNEE: ICD-10-CM

## 2024-08-23 DIAGNOSIS — M70.62 TROCHANTERIC BURSITIS OF LEFT HIP: Primary | ICD-10-CM

## 2024-08-23 DIAGNOSIS — G89.29 CHRONIC PAIN OF BOTH KNEES: ICD-10-CM

## 2024-08-23 PROCEDURE — 90471 IMMUNIZATION ADMIN: CPT | Performed by: NURSE PRACTITIONER

## 2024-08-23 PROCEDURE — 73564 X-RAY EXAM KNEE 4 OR MORE: CPT | Mod: TC | Performed by: RADIOLOGY

## 2024-08-23 PROCEDURE — 90715 TDAP VACCINE 7 YRS/> IM: CPT | Performed by: NURSE PRACTITIONER

## 2024-08-23 PROCEDURE — 99213 OFFICE O/P EST LOW 20 MIN: CPT | Mod: 25 | Performed by: NURSE PRACTITIONER

## 2024-08-23 RX ORDER — FLUTICASONE PROPIONATE 50 MCG
2 SPRAY, SUSPENSION (ML) NASAL DAILY
COMMUNITY
Start: 2024-07-02

## 2024-08-23 RX ORDER — DICLOFENAC POTASSIUM 50 MG/1
50 TABLET, FILM COATED ORAL 2 TIMES DAILY
COMMUNITY
Start: 2024-07-22 | End: 2024-08-26

## 2024-08-23 RX ORDER — METHYLPREDNISOLONE 4 MG
TABLET, DOSE PACK ORAL
Qty: 21 TABLET | Refills: 0 | Status: SHIPPED | OUTPATIENT
Start: 2024-08-23 | End: 2024-09-12

## 2024-08-23 ASSESSMENT — ENCOUNTER SYMPTOMS
FATIGUE: 1
HIP PAIN: 1

## 2024-08-23 NOTE — PROGRESS NOTES
"  Assessment & Plan     Trochanteric bursitis of left hip  Will treat for trochanteric bursitis given presentation and history of bursitis.  She has pain with palpation the greater trochanter on the left and trouble laying on the left hip, in particular trouble sleeping.  Discussed common side effects of methylprednisolone, recommend taking with food and to try to use NSAIDs sparingly to help prevent GI upset.     Discussed potential differentials for her left leg pain, including bursitis, sciatica, herniated disc, OA.  Less likely to consider DVT without any appreciable swelling or erythema.    Keep scheduled f/up with PCP.     - methylPREDNISolone (MEDROL DOSEPAK) 4 MG tablet therapy pack; Follow Package Directions    Chronic pain of both knees  Reports history of OA and suggestion for RTK by ortho in [place].  She has acute on chronic left knee pain.  Pain seems to stem from knee ,but she is rubbing her entire leg and rocking in pain.  Due to acute pain, did obtain xray and waiting on over read by radiology.  Referral placed to [facility].   - Orthopedic  Referral; Future    Acute pain of left knee  - XR Knee Left G/E 4 Views  - Orthopedic  Referral; Future          BMI  Estimated body mass index is 29.19 kg/m  as calculated from the following:    Height as of this encounter: 1.592 m (5' 2.68\").    Weight as of this encounter: 74 kg (163 lb 1.6 oz).             Subjective   Yolande is a 58 year old, presenting for the following health issues:  Hip Pain (Left hip and knee pain. Achy and throbbing pain. Patient states sometimes it's hard for her to get up. Patient did have bursitis in the past.) and Fatigue (Patient states she always feels fatigued and tired.)        8/23/2024     9:47 AM   Additional Questions   Roomed by Homero     Yolande is a 58-year-old female who presents today for left side hip and knee pain for \"a long time\", difficult to down onset and specifics.  She describes a throbbing " "pain in her entire left leg that is most noticeable with sitting or laying down.  She does not notice the pain as much when she is active.  She has trouble sleeping.  She is getting less than 4 hours of sleep per night per her smart watch.  She reports a history of arthritis and bursitis.  Reports was told by orthopedic specialist in [place] that she needed her right knee replaced.  She does tend to favor the right knee so wonders if she is compensated with the left side. Rates pain 10/10.    She describes left knee pain all the way down her lower leg and all the way up to her hip.  How she describes, it seems to start in the knee. Reports she is already on nerve pain med, inflammation med for right side.  I think she may be referring to duloxetine.  Sounds like she is taking NSAID over-the-counter as well.  She is using Voltaren gel and Biofreeze bilaterally.    Associated symptoms include fatigue due to lack of sleep.  Some intermittent paresthesias in the bilateral lower extremities.  Left side weakness, knee gives out. States she has to manual lift her left leg to get into the car.     Denies fever, chills, swelling, or erythema.  Has chronic low back pain.      Pain with palpation over greater trochanteric  Pain with laying on hip    History of Present Illness       Reason for visit:  Horrible pain in left knee and hip sleeping is hard body just aches headaches  tired all the time the pain makes me tired   She is taking medications regularly.                     Objective    /76 (BP Location: Right arm, Patient Position: Sitting, Cuff Size: Adult Large)   Pulse 92   Temp 98.8  F (37.1  C) (Tympanic)   Resp 18   Ht 1.592 m (5' 2.68\")   Wt 74 kg (163 lb 1.6 oz)   LMP  (LMP Unknown)   SpO2 95%   BMI 29.19 kg/m    Body mass index is 29.19 kg/m .  Physical Exam  Constitutional:       General: She is in acute distress.   Cardiovascular:      Rate and Rhythm: Normal rate and regular rhythm.   Pulmonary: "      Effort: Pulmonary effort is normal.   Musculoskeletal:      Cervical back: Tenderness present.      Thoracic back: No tenderness.      Lumbar back: No tenderness. Negative right straight leg raise test and negative left straight leg raise test.      Right hip: Normal range of motion.      Left hip: Tenderness present. Normal range of motion.      Right knee: Normal.      Left knee: No swelling, deformity, effusion or erythema.      Comments: With palpation of greater trochanter   Neurological:      Mental Status: She is alert and oriented to person, place, and time.   Psychiatric:         Mood and Affect: Mood is anxious.         Speech: Speech is rapid and pressured.         Behavior: Behavior is agitated. Behavior is cooperative.              Signed Electronically by: OSIEL Almanzar CNP

## 2024-08-24 ENCOUNTER — MYC MEDICAL ADVICE (OUTPATIENT)
Dept: FAMILY MEDICINE | Facility: CLINIC | Age: 59
End: 2024-08-24
Payer: COMMERCIAL

## 2024-08-24 NOTE — LETTER
2024      Raissa Clarke  [address] [address]        To Whom It May Concern,     Raissa Clarke,  2024, has been my patient since  and is under my care for mental health. She would benefit from an apartment with an extra bedroom to allow for routines related to her mental health and adequate windows to prevent worsening of her symptoms due to lack of natural light.          Sincerely,        Aaron Hernandez MD

## 2024-08-24 NOTE — LETTER
2024        Raissa Clarke  [address] [address]           To Whom It May Concern,      Raissa Clarke,  2024, has been my patient since  and is under my care for mental health. She would benefit from an apartment with an extra bedroom to allow for routines related to her mental health and adequate windows to prevent worsening of her symptoms due to lack of natural light.              Sincerely,           Aaron Hernandez MD

## 2024-08-26 ENCOUNTER — MYC REFILL (OUTPATIENT)
Dept: FAMILY MEDICINE | Facility: CLINIC | Age: 59
End: 2024-08-26
Payer: COMMERCIAL

## 2024-08-26 DIAGNOSIS — G89.29 CHRONIC PAIN OF BOTH KNEES: Primary | ICD-10-CM

## 2024-08-26 DIAGNOSIS — M25.562 CHRONIC PAIN OF BOTH KNEES: Primary | ICD-10-CM

## 2024-08-26 DIAGNOSIS — M25.561 CHRONIC PAIN OF BOTH KNEES: Primary | ICD-10-CM

## 2024-08-26 RX ORDER — DICLOFENAC POTASSIUM 50 MG/1
50 TABLET, FILM COATED ORAL 2 TIMES DAILY
Qty: 60 TABLET | Refills: 0 | Status: SHIPPED | OUTPATIENT
Start: 2024-08-26 | End: 2024-09-25

## 2024-08-26 NOTE — TELEPHONE ENCOUNTER
Routing refill request to provider for review/approval because:  Medication is reported/historical    Last Written Prescription Date:  7/22/24  historical   Last office visit: 8/23/2024   Future Office Visit:   Next 5 appointments (look out 90 days)      Sep 12, 2024 3:15 PM  (Arrive by 2:55 PM)  Adult Preventative Visit with Aaron Hernandez MD  [facility] [address] [address] [phone]

## 2024-09-12 ENCOUNTER — OFFICE VISIT (OUTPATIENT)
Dept: FAMILY MEDICINE | Facility: CLINIC | Age: 59
End: 2024-09-12
Payer: COMMERCIAL

## 2024-09-12 VITALS
TEMPERATURE: 98.8 F | HEIGHT: 63 IN | SYSTOLIC BLOOD PRESSURE: 133 MMHG | RESPIRATION RATE: 18 BRPM | HEART RATE: 93 BPM | OXYGEN SATURATION: 94 % | BODY MASS INDEX: 28.63 KG/M2 | WEIGHT: 161.6 LBS | DIASTOLIC BLOOD PRESSURE: 75 MMHG

## 2024-09-12 DIAGNOSIS — R32 URINARY INCONTINENCE, UNSPECIFIED TYPE: ICD-10-CM

## 2024-09-12 DIAGNOSIS — D64.9 ANEMIA, UNSPECIFIED TYPE: ICD-10-CM

## 2024-09-12 DIAGNOSIS — Z00.00 ROUTINE GENERAL MEDICAL EXAMINATION AT A HEALTH CARE FACILITY: Primary | ICD-10-CM

## 2024-09-12 DIAGNOSIS — M25.50 MULTIPLE JOINT PAIN: ICD-10-CM

## 2024-09-12 DIAGNOSIS — Z13.6 SCREENING FOR CARDIOVASCULAR CONDITION: ICD-10-CM

## 2024-09-12 DIAGNOSIS — F41.1 GENERALIZED ANXIETY DISORDER: ICD-10-CM

## 2024-09-12 DIAGNOSIS — Z11.4 SCREENING FOR HIV (HUMAN IMMUNODEFICIENCY VIRUS): ICD-10-CM

## 2024-09-12 DIAGNOSIS — Z11.59 NEED FOR HEPATITIS C SCREENING TEST: ICD-10-CM

## 2024-09-12 DIAGNOSIS — Z15.09 HEREDITARY LEIOMYOMATOSIS AND RENAL CELL CANCER (HLRCC): ICD-10-CM

## 2024-09-12 LAB
ALBUMIN SERPL BCG-MCNC: 3.8 G/DL (ref 3.5–5.2)
ALP SERPL-CCNC: 79 U/L (ref 40–150)
ALT SERPL W P-5'-P-CCNC: 20 U/L (ref 0–50)
ANION GAP SERPL CALCULATED.3IONS-SCNC: 9 MMOL/L (ref 7–15)
AST SERPL W P-5'-P-CCNC: 25 U/L (ref 0–45)
BILIRUB SERPL-MCNC: 0.2 MG/DL
BUN SERPL-MCNC: 15.2 MG/DL (ref 6–20)
CALCIUM SERPL-MCNC: 8.7 MG/DL (ref 8.8–10.4)
CHLORIDE SERPL-SCNC: 104 MMOL/L (ref 98–107)
CHOLEST SERPL-MCNC: 139 MG/DL
CREAT SERPL-MCNC: 0.85 MG/DL (ref 0.51–0.95)
CRP SERPL-MCNC: 13.5 MG/L
EGFRCR SERPLBLD CKD-EPI 2021: 79 ML/MIN/1.73M2
ERYTHROCYTE [DISTWIDTH] IN BLOOD BY AUTOMATED COUNT: 17.7 % (ref 10–15)
ERYTHROCYTE [SEDIMENTATION RATE] IN BLOOD BY WESTERGREN METHOD: 19 MM/HR (ref 0–30)
FASTING STATUS PATIENT QL REPORTED: ABNORMAL
FASTING STATUS PATIENT QL REPORTED: ABNORMAL
GLUCOSE SERPL-MCNC: 73 MG/DL (ref 70–99)
HCO3 SERPL-SCNC: 26 MMOL/L (ref 22–29)
HCT VFR BLD AUTO: 33.8 % (ref 35–47)
HCV AB SERPL QL IA: NONREACTIVE
HDLC SERPL-MCNC: 51 MG/DL
HGB BLD-MCNC: 9.8 G/DL (ref 11.7–15.7)
HIV 1+2 AB+HIV1 P24 AG SERPL QL IA: NONREACTIVE
LDLC SERPL CALC-MCNC: 46 MG/DL
MCH RBC QN AUTO: 22.2 PG (ref 26.5–33)
MCHC RBC AUTO-ENTMCNC: 29 G/DL (ref 31.5–36.5)
MCV RBC AUTO: 77 FL (ref 78–100)
NONHDLC SERPL-MCNC: 88 MG/DL
PLATELET # BLD AUTO: 338 10E3/UL (ref 150–450)
POTASSIUM SERPL-SCNC: 4 MMOL/L (ref 3.4–5.3)
PROT SERPL-MCNC: 6.9 G/DL (ref 6.4–8.3)
RBC # BLD AUTO: 4.41 10E6/UL (ref 3.8–5.2)
RHEUMATOID FACT SERPL-ACNC: <10 IU/ML
SODIUM SERPL-SCNC: 139 MMOL/L (ref 135–145)
TRIGL SERPL-MCNC: 212 MG/DL
WBC # BLD AUTO: 8.9 10E3/UL (ref 4–11)

## 2024-09-12 PROCEDURE — 99214 OFFICE O/P EST MOD 30 MIN: CPT | Mod: 25 | Performed by: FAMILY MEDICINE

## 2024-09-12 PROCEDURE — 86038 ANTINUCLEAR ANTIBODIES: CPT | Performed by: FAMILY MEDICINE

## 2024-09-12 PROCEDURE — 85027 COMPLETE CBC AUTOMATED: CPT | Performed by: FAMILY MEDICINE

## 2024-09-12 PROCEDURE — 83550 IRON BINDING TEST: CPT | Performed by: FAMILY MEDICINE

## 2024-09-12 PROCEDURE — 86803 HEPATITIS C AB TEST: CPT | Performed by: FAMILY MEDICINE

## 2024-09-12 PROCEDURE — 86140 C-REACTIVE PROTEIN: CPT | Performed by: FAMILY MEDICINE

## 2024-09-12 PROCEDURE — 80061 LIPID PANEL: CPT | Performed by: FAMILY MEDICINE

## 2024-09-12 PROCEDURE — 86431 RHEUMATOID FACTOR QUANT: CPT | Performed by: FAMILY MEDICINE

## 2024-09-12 PROCEDURE — 83540 ASSAY OF IRON: CPT | Performed by: FAMILY MEDICINE

## 2024-09-12 PROCEDURE — 87389 HIV-1 AG W/HIV-1&-2 AB AG IA: CPT | Performed by: FAMILY MEDICINE

## 2024-09-12 PROCEDURE — 85652 RBC SED RATE AUTOMATED: CPT | Performed by: FAMILY MEDICINE

## 2024-09-12 PROCEDURE — 80053 COMPREHEN METABOLIC PANEL: CPT | Performed by: FAMILY MEDICINE

## 2024-09-12 PROCEDURE — 99396 PREV VISIT EST AGE 40-64: CPT | Performed by: FAMILY MEDICINE

## 2024-09-12 PROCEDURE — 36415 COLL VENOUS BLD VENIPUNCTURE: CPT | Performed by: FAMILY MEDICINE

## 2024-09-12 PROCEDURE — 82728 ASSAY OF FERRITIN: CPT | Performed by: FAMILY MEDICINE

## 2024-09-12 RX ORDER — DULOXETIN HYDROCHLORIDE 60 MG/1
60 CAPSULE, DELAYED RELEASE ORAL 2 TIMES DAILY
Qty: 180 CAPSULE | Refills: 3 | Status: SHIPPED | OUTPATIENT
Start: 2024-09-12

## 2024-09-12 NOTE — PATIENT INSTRUCTIONS
Patient Education   Preventive Care Advice   This is general advice given by our system to help you stay healthy. However, your care team may have specific advice just for you. Please talk to your care team about your preventive care needs.  Nutrition  Eat 5 or more servings of fruits and vegetables each day.  Try wheat bread, brown rice and whole grain pasta (instead of white bread, rice, and pasta).  Get enough calcium and vitamin D. Check the label on foods and aim for 100% of the RDA (recommended daily allowance).  Lifestyle  Exercise at least 150 minutes each week  (30 minutes a day, 5 days a week).  Do muscle strengthening activities 2 days a week. These help control your weight and prevent disease.  No smoking.  Wear sunscreen to prevent skin cancer.  Have a dental exam and cleaning every 6 months.  Yearly exams  See your health care team every year to talk about:  Any changes in your health.  Any medicines your care team has prescribed.  Preventive care, family planning, and ways to prevent chronic diseases.  Shots (vaccines)   HPV shots (up to age 26), if you've never had them before.  Hepatitis B shots (up to age 59), if you've never had them before.  COVID-19 shot: Get this shot when it's due.  Flu shot: Get a flu shot every year.  Tetanus shot: Get a tetanus shot every 10 years.  Pneumococcal, hepatitis A, and RSV shots: Ask your care team if you need these based on your risk.  Shingles shot (for age 50 and up)  General health tests  Diabetes screening:  Starting at age 35, Get screened for diabetes at least every 3 years.  If you are younger than age 35, ask your care team if you should be screened for diabetes.  Cholesterol test: At age 39, start having a cholesterol test every 5 years, or more often if advised.  Bone density scan (DEXA): At age 50, ask your care team if you should have this scan for osteoporosis (brittle bones).  Hepatitis C: Get tested at least once in your life.  STIs (sexually  transmitted infections)  Before age 24: Ask your care team if you should be screened for STIs.  After age 24: Get screened for STIs if you're at risk. You are at risk for STIs (including HIV) if:  You are sexually active with more than one person.  You don't use condoms every time.  You or a partner was diagnosed with a sexually transmitted infection.  If you are at risk for HIV, ask about PrEP medicine to prevent HIV.  Get tested for HIV at least once in your life, whether you are at risk for HIV or not.  Cancer screening tests  Cervical cancer screening: If you have a cervix, begin getting regular cervical cancer screening tests starting at age 21.  Breast cancer scan (mammogram): If you've ever had breasts, begin having regular mammograms starting at age 40. This is a scan to check for breast cancer.  Colon cancer screening: It is important to start screening for colon cancer at age 45.  Have a colonoscopy test every 10 years (or more often if you're at risk) Or, ask your provider about stool tests like a FIT test every year or Cologuard test every 3 years.  To learn more about your testing options, visit:   .  For help making a decision, visit:   https://bit.ly/yc98938.  Prostate cancer screening test: If you have a prostate, ask your care team if a prostate cancer screening test (PSA) at age 55 is right for you.  Lung cancer screening: If you are a current or former smoker ages 50 to 80, ask your care team if ongoing lung cancer screenings are right for you.  For informational purposes only. Not to replace the advice of your health care provider. Copyright   2023 [facility]. All rights reserved. Clinically reviewed by the [facility]. [employer] 690017 - REV 01/24.  Preventing Falls: Care Instructions  Injuries and health problems such as trouble walking or poor eyesight can increase your risk of falling. So can some medicines. But there are things you can do to help  "prevent falls. You can exercise to get stronger. You can also arrange your home to make it safer.    Talk to your doctor about the medicines you take. Ask if any of them increase the risk of falls and whether they can be changed or stopped.   Try to exercise regularly. It can help improve your strength and balance. This can help lower your risk of falling.     Practice fall safety and prevention.    Wear low-heeled shoes that fit well and give your feet good support. Talk to your doctor if you have foot problems that make this hard.  Carry a cellphone or wear a medical alert device that you can use to call for help.  Use stepladders instead of chairs to reach high objects. Don't climb if you're at risk for falls. Ask for help, if needed.  Wear the correct eyeglasses, if you need them.    Make your home safer.    Remove rugs, cords, clutter, and furniture from walkways.  Keep your house well lit. Use night-lights in hallways and bathrooms.  Install and use sturdy handrails on stairways.  Wear nonskid footwear, even inside. Don't walk barefoot or in socks without shoes.    Be safe outside.    Use handrails, curb cuts, and ramps whenever possible.  Keep your hands free by using a shoulder bag or backpack.  Try to walk in well-lit areas. Watch out for uneven ground, changes in pavement, and debris.  Be careful in the winter. Walk on the grass or gravel when sidewalks are slippery. Use de-icer on steps and walkways. Add non-slip devices to shoes.    Put grab bars and nonskid mats in your shower or tub and near the toilet. Try to use a shower chair or bath bench when bathing.   Get into a tub or shower by putting in your weaker leg first. Get out with your strong side first. Have a phone or medical alert device in the bathroom with you.   Where can you learn more?  Go to https://www.[employer].net/patiented  Enter G117 in the search box to learn more about \"Preventing Falls: Care Instructions.\"  Current as of: July 17, " 2023               Content Version: 14.0    1777-1108 [employer].   Care instructions adapted under license by your healthcare professional. If you have questions about a medical condition or this instruction, always ask your healthcare professional. [employer] disclaims any warranty or liability for your use of this information.      Learning About Stress  What is stress?     Stress is your body's response to a hard situation. Your body can have a physical, emotional, or mental response. Stress is a fact of life for most people, and it affects everyone differently. What causes stress for you may not be stressful for someone else.  A lot of things can cause stress. You may feel stress when you go on a job interview, take a test, or run a race. This kind of short-term stress is normal and even useful. It can help you if you need to work hard or react quickly. For example, stress can help you finish an important job on time.  Long-term stress is caused by ongoing stressful situations or events. Examples of long-term stress include long-term health problems, ongoing problems at work, or conflicts in your family. Long-term stress can harm your health.  How does stress affect your health?  When you are stressed, your body responds as though you are in danger. It makes hormones that speed up your heart, make you breathe faster, and give you a burst of energy. This is called the fight-or-flight stress response. If the stress is over quickly, your body goes back to normal and no harm is done.  But if stress happens too often or lasts too long, it can have bad effects. Long-term stress can make you more likely to get sick, and it can make symptoms of some diseases worse. If you tense up when you are stressed, you may develop neck, shoulder, or low back pain. Stress is linked to high blood pressure and heart disease.  Stress also harms your emotional health. It can make you mercado, tense, or  depressed. Your relationships may suffer, and you may not do well at work or school.  What can you do to manage stress?  You can try these things to help manage stress:   Do something active. Exercise or activity can help reduce stress. Walking is a great way to get started. Even everyday activities such as housecleaning or yard work can help.  Try yoga or marisa chi. These techniques combine exercise and meditation. You may need some training at first to learn them.  Do something you enjoy. For example, listen to music or go to a movie. Practice your hobby or do volunteer work.  Meditate. This can help you relax, because you are not worrying about what happened before or what may happen in the future.  Do guided imagery. Imagine yourself in any setting that helps you feel calm. You can use online videos, books, or a teacher to guide you.  Do breathing exercises. For example:  From a standing position, bend forward from the waist with your knees slightly bent. Let your arms dangle close to the floor.  Breathe in slowly and deeply as you return to a standing position. Roll up slowly and lift your head last.  Hold your breath for just a few seconds in the standing position.  Breathe out slowly and bend forward from the waist.  Let your feelings out. Talk, laugh, cry, and express anger when you need to. Talking with supportive friends or family, a counselor, or a leanne leader about your feelings is a healthy way to relieve stress. Avoid discussing your feelings with people who make you feel worse.  Write. It may help to write about things that are bothering you. This helps you find out how much stress you feel and what is causing it. When you know this, you can find better ways to cope.  What can you do to prevent stress?  You might try some of these things to help prevent stress:  Manage your time. This helps you find time to do the things you want and need to do.  Get enough sleep. Your body recovers from the stresses  "of the day while you are sleeping.  Get support. Your family, friends, and community can make a difference in how you experience stress.  Limit your news feed. Avoid or limit time on social media or news that may make you feel stressed.  Do something active. Exercise or activity can help reduce stress. Walking is a great way to get started.  Where can you learn more?  Go to https://www.[employer].net/patiented  Enter N032 in the search box to learn more about \"Learning About Stress.\"  Current as of: October 24, 2023               Content Version: 14.0    6631-5914 [employer].   Care instructions adapted under license by your healthcare professional. If you have questions about a medical condition or this instruction, always ask your healthcare professional. [employer] disclaims any warranty or liability for your use of this information.      Substance Use Disorder: Care Instructions  Overview     You can improve your life and health by stopping your use of alcohol or drugs. When you don't drink or use drugs, you may feel and sleep better. You may get along better with your family, friends, and coworkers. There are medicines and programs that can help with substance use disorder.  How can you care for yourself at home?  Here are some ways to help you stay sober and prevent relapse.  If you have been given medicine to help keep you sober or reduce your cravings, be sure to take it exactly as prescribed.  Talk to your doctor about programs that can help you stop using drugs or drinking alcohol.  Do not keep alcohol or drugs in your home.  Plan ahead. Think about what you'll say if other people ask you to drink or use drugs. Try not to spend time with people who drink or use drugs.  Use the time and money spent on drinking or drugs to do something that's important to you.  Preventing a relapse  Have a plan to deal with relapse. Learn to recognize changes in your thinking that lead you to " drink or use drugs. Get help before you start to drink or use drugs again.  Try to stay away from situations, friends, or places that may lead you to drink or use drugs.  If you feel the need to drink alcohol or use drugs again, seek help right away. Call a trusted friend or family member. Some people get support from organizations such as Narcotics Anonymous or [employer] or from treatment facilities.  If you relapse, get help as soon as you can. Some people make a plan with another person that outlines what they want that person to do for them if they relapse. The plan usually includes how to handle the relapse and who to notify in case of relapse.  Don't give up. Remember that a relapse doesn't mean that you have failed. Use the experience to learn the triggers that lead you to drink or use drugs. Then quit again. Recovery is a lifelong process. Many people have several relapses before they are able to quit for good.  Follow-up care is a key part of your treatment and safety. Be sure to make and go to all appointments, and call your doctor if you are having problems. It's also a good idea to know your test results and keep a list of the medicines you take.  When should you call for help?   Call 911  anytime you think you may need emergency care. For example, call if you or someone else:    Has overdosed or has withdrawal signs. Be sure to tell the emergency workers that you are or someone else is using or trying to quit using drugs. Overdose or withdrawal signs may include:  Losing consciousness.  Seizure.  Seeing or hearing things that aren't there (hallucinations).     Is thinking or talking about suicide or harming others.   Where to get help 24 hours a day, 7 days a week   If you or someone you know talks about suicide, self-harm, a mental health crisis, a substance use crisis, or any other kind of emotional distress, get help right away. You can:    Call the Suicide and Crisis Lifeline at 988.     Call  "8-327-949-TALK (1-770.425.5940).     Text HOME to 390885 to access the Crisis Text Line.   Consider saving these numbers in your phone.  Go to [employer] for more information or to chat online.  Call your doctor now or seek immediate medical care if:    You are having withdrawal symptoms. These may include nausea or vomiting, sweating, shakiness, and anxiety.   Watch closely for changes in your health, and be sure to contact your doctor if:    You have a relapse.     You need more help or support to stop.   Where can you learn more?  Go to https://www.[employer].net/patiented  Enter H573 in the search box to learn more about \"Substance Use Disorder: Care Instructions.\"  Current as of: November 15, 2023               Content Version: 14.0    6899-3193 [employer].   Care instructions adapted under license by your healthcare professional. If you have questions about a medical condition or this instruction, always ask your healthcare professional. [employer] disclaims any warranty or liability for your use of this information.      Safer Sex: Care Instructions  Overview  Safer sex is a way to reduce your risk of getting a sexually transmitted infection (STI). It can also help prevent pregnancy.  Several products can help you practice safer sex and reduce your chance of STIs. One of the best is a condom. There are internal and external condoms. You can use a special rubber sheet (dental dam) for protection during oral sex. Disposable gloves can keep your hands from touching blood, semen, or other body fluids that can carry infections.  Remember that birth control methods such as diaphragms, IUDs, foams, and birth control pills do not stop you from getting STIs.  Follow-up care is a key part of your treatment and safety. Be sure to make and go to all appointments, and call your doctor if you are having problems. It's also a good idea to know your test results and keep a list of the " "medicines you take.  How can you care for yourself at home?  Think about getting vaccinated to help prevent hepatitis A, hepatitis B, and human papillomavirus (HPV). They can be spread through sex.  Use a condom every time you have sex. Use an external condom, which goes on the penis. Or use an internal condom, which goes into the vagina or anus.  Make sure you use the right size external condom. A condom that's too small can break easily. A condom that's too big can slip off during sex.  Use a new condom each time you have sex. Be careful not to poke a hole in the condom when you open the wrapper.  Don't use an internal condom and an external condom at the same time.  Never use petroleum jelly (such as Vaseline), grease, hand lotion, baby oil, or anything with oil in it. These products can make holes in the condom.  After intercourse, hold the edge of the condom as you remove it. This will help keep semen from spilling out of the condom.  Do not have sex with anyone who has symptoms of an STI, such as sores on the genitals or mouth.  Do not drink a lot of alcohol or use drugs before sex.  Limit your sex partners. Sex with one partner who has sex only with you can reduce your risk of getting an STI.  Don't share sex toys. But if you do share them, use a condom and clean the sex toys between each use.  Talk to any partners before you have sex. Talk about what you feel comfortable with and whether you have any boundaries with sex. And find out if your partner or partners may be at risk for any STI. Keep in mind that a person may be able to spread an STI even if they do not have symptoms. You and any partners may want to get tested for STIs.  Where can you learn more?  Go to https://www.[employer].net/patiented  Enter B608 in the search box to learn more about \"Safer Sex: Care Instructions.\"  Current as of: November 27, 2023               Content Version: 14.0    [id number] [employer].   Care instructions " adapted under license by your healthcare professional. If you have questions about a medical condition or this instruction, always ask your healthcare professional. [employer] disclaims any warranty or liability for your use of this information.

## 2024-09-12 NOTE — PROGRESS NOTES
Preventive Care Visit  [facility]  Aaron Hernandez MD, Family Medicine  Sep 12, 2024      Assessment & Plan     Routine general medical examination at a health care facility  Health maintenance updated, preventive services reviewed, vaccines discussed, questions are answered, patient encouraged to continue annual wellness visits to review preventive services    Multiple joint pain  Patient with normal xr knees, persistent pain, will check labs as ordered  - ESR: Erythrocyte sedimentation rate; Future  - CRP, inflammation; Future  - CBC with platelets; Future  - Rheumatoid factor; Future  - Anti Nuclear Neda IgG by IFA with Reflex; Future  - Comprehensive metabolic panel (BMP + Alb, Alk Phos, ALT, AST, Total. Bili, TP); Future  - ESR: Erythrocyte sedimentation rate  - CRP, inflammation  - CBC with platelets  - Rheumatoid factor  - Anti Nuclear Neda IgG by IFA with Reflex  - Comprehensive metabolic panel (BMP + Alb, Alk Phos, ALT, AST, Total. Bili, TP)    Screening for HIV (human immunodeficiency virus)  Screening today  - HIV Antigen Antibody Combo; Future  - HIV Antigen Antibody Combo    Need for hepatitis C screening test  Screening today  - Hepatitis C Screen Reflex to HCV RNA Quant and Genotype; Future  - Hepatitis C Screen Reflex to HCV RNA Quant and Genotype    Screening for cardiovascular condition  Screening today  - Lipid panel reflex to direct LDL Non-fasting; Future  - Lipid panel reflex to direct LDL Non-fasting    Generalized anxiety disorder  Stable on 60mg bid   - DULoxetine (CYMBALTA) 60 MG capsule; Take 1 capsule (60 mg) by mouth 2 times daily.    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 Consider referral to dermatology pending consult  - Adult Oncology/Hematology  Referral; Future    Urinary incontinence, unspecified type  Referred to obgyn to consider  "surgery  - Ob/Gyn  Referral; Future            BMI  Estimated body mass index is 28.92 kg/m  as calculated from the following:    Height as of this encounter: 1.592 m (5' 2.68\").    Weight as of this encounter: 73.3 kg (161 lb 9.6 oz).       Counseling  Appropriate preventive services were addressed with this patient via screening, questionnaire, or discussion as appropriate for fall prevention, nutrition, physical activity, Tobacco-use cessation, social engagement, weight loss and cognition.  Checklist reviewing preventive services available has been given to the patient.  Reviewed patient's diet, addressing concerns and/or questions.   She is at risk for lack of exercise and has been provided with information to increase physical activity for the benefit of her well-being.           Urbano Lanier is a 58 year old, presenting for the following:  Physical and Musculoskeletal Problem (Right knee and left hip has been causing pain)        9/12/2024     2:52 PM   Additional Questions   [facility]       Health Care Directive  Patient does not have a Health Care Directive or Living Will: Discussed advance care planning with patient; however, patient declined at this time.    Musculoskeletal Problem      Here for annual exam  Also concerned about joint pain  Knees are worst, reviewed xrays  Discussed skin concerns. Reviewed prior results            9/11/2024   General Health   How would you rate your overall physical health? (!) POOR   Feel stress (tense, anxious, or unable to sleep) Very much      (!) STRESS CONCERN      9/11/2024   Nutrition   Three or more servings of calcium each day? (!) NO   Diet: I don't know   How many servings of fruit and vegetables per day? (!) 0-1   How many sweetened beverages each day? 0-1            9/11/2024   Exercise   Days per week of moderate/strenous exercise 2 days   Average minutes spent exercising at this level 10 min      (!) EXERCISE CONCERN      9/11/2024 "   Social Factors   Frequency of gathering with friends or relatives Once a week   Worry food won't last until get money to buy more No   Food not last or not have enough money for food? No   Do you have housing? (Housing is defined as stable permanent housing and does not include staying ouside in a car, in a tent, in an abandoned building, in an overnight shelter, or couch-surfing.) Yes   Are you worried about losing your housing? No   Lack of transportation? No   Unable to get utilities (heat,electricity)? No            9/12/2024   Fall Risk   Gait Speed Test Interpretation Less than or equal to 5.00 seconds - PASS              9/11/2024   Dental   Dentist two times every year? Yes            4/11/2024   TB Screening   Were you born outside of the [place]? No              Today's PHQ-2 Score:       4/11/2024     1:54 PM   PHQ-2 ( 1999 [employer])   Q1: Little interest or pleasure in doing things 2   Q2: Feeling down, depressed or hopeless 2   PHQ-2 Score 4   Q1: Little interest or pleasure in doing things More than half the days   Q2: Feeling down, depressed or hopeless More than half the days   PHQ-2 Score 4         9/11/2024   Substance Use   Alcohol more than 3/day or more than 7/wk No   Do you use any other substances recreationally? (!) OTHER        Social History     Tobacco Use    Smoking status: Never     Passive exposure: Never    Smokeless tobacco: Never   Vaping Use    Vaping status: Some Days    Substances: Nicotine, Flavoring    Passive vaping exposure: Yes          Mammogram Screening - Mammogram every 1-2 years updated in Health Maintenance based on mutual decision making          9/11/2024   One time HIV Screening   Previous HIV test? No          9/11/2024   STI Screening   New sexual partner(s) since last STI/HIV test? (!) YES         History of abnormal Pap smear: Status post hysterectomy with removal of cervix and no history of CIN2 or greater or cervical cancer. Health Maintenance and Surgical History  "updated.       ASCVD Risk   The ASCVD Risk score (Payton MAYORGA, et al., 2019) failed to calculate for the following reasons:    Cannot find a previous HDL lab    Cannot find a previous total cholesterol lab           Reviewed and updated as needed this visit by Provider   Tobacco  Allergies  Meds  Problems  Med Hx  Surg Hx  Fam Hx                     Objective    Exam  /75 (BP Location: Right arm, Patient Position: Sitting, Cuff Size: Adult Large)   Pulse 93   Temp 98.8  F (37.1  C) (Tympanic)   Resp 18   Ht 1.592 m (5' 2.68\")   Wt 73.3 kg (161 lb 9.6 oz)   LMP  (LMP Unknown)   SpO2 94%   BMI 28.92 kg/m     Estimated body mass index is 28.92 kg/m  as calculated from the following:    Height as of this encounter: 1.592 m (5' 2.68\").    Weight as of this encounter: 73.3 kg (161 lb 9.6 oz).    Physical Exam  GENERAL: alert and no distress  EYES: Eyes grossly normal to inspection, PERRL and conjunctivae and sclerae normal  HENT: ear canals and TM's normal, nose and mouth without ulcers or lesions  NECK: no adenopathy, no asymmetry, masses, or scars  RESP: lungs clear to auscultation - no rales, rhonchi or wheezes  CV: regular rate and rhythm, normal S1 S2, no S3 or S4, no murmur, click or rub, no peripheral edema  ABDOMEN: soft, nontender, no hepatosplenomegaly, no masses and bowel sounds normal  MS: no gross musculoskeletal defects noted, no edema  SKIN: patient with collection of raised flesh colored lesions centrally on mid back  NEURO: Normal strength and tone, mentation intact and speech normal  PSYCH: mentation appears normal, affect normal/bright        Signed Electronically by: Aaron Hernandez MD    "

## 2024-09-13 ENCOUNTER — PATIENT OUTREACH (OUTPATIENT)
Dept: ONCOLOGY | Facility: CLINIC | Age: 59
End: 2024-09-13
Payer: COMMERCIAL

## 2024-09-13 LAB
FERRITIN SERPL-MCNC: 7 NG/ML (ref 11–328)
IRON BINDING CAPACITY (ROCHE): 454 UG/DL (ref 240–430)
IRON SATN MFR SERPL: 4 % (ref 15–46)
IRON SERPL-MCNC: 17 UG/DL (ref 37–145)

## 2024-09-13 NOTE — PROGRESS NOTES
Writer received referral to Cancer Risk Management/Genetic Counseling.    Referred for:     patient with history of HLRCC by genetics in 2018, never followed up, wondering what screening plan and next steps would be      Referred By    Provider Department Location Phone   Aaron Hernandez MD [place] [facility] [phone]     This patient was referred for history of HLRCC.  She was seen by genetics in 2018 (), never followed up.  Referring provider is wondering what screening plan and next steps would be.      Former GC plan below 12/18/18 ([facility]):  PLAN:   1. 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 No additional variants of clinical or uncertain significance were detected by sequencing or deletion/duplication analysis in any of the other genes tested on this panel.     2. Raissa appeared to have a good understanding of her test results and the information discussed at our appointment, and she will be mailed a copy of her results report. All questions were answered to apparent patient satisfaction.     3. Raissa will be emailed a Release of Information form regarding her genetic testing results and consultations. I would be happy to meet with any at-risk family members for a genetic consultation.     4. Raissa will be seen by her new oncologist in [place]. I have offered to help send necessary information to her new oncologist once she selects them. Given the genetic testing results, it is recommended that an oncologist establish a screening/ management plan and coordinate appropriate appointments.     5. Raissa was given my contact information and was encouraged to contact me with any further questions or concerns.     PLAN:  I have a message out to OSIEL Jarrett, to advise on who to see.    Sukhdev has responded  to indicate she will see to discuss screening.  No history of cancer was found in review of chart.    Shy Vega RN, BSN  Oncology New Patient Nurse Navigator   [facility]

## 2024-09-16 ENCOUNTER — TELEPHONE (OUTPATIENT)
Dept: FAMILY MEDICINE | Facility: CLINIC | Age: 59
End: 2024-09-16
Payer: COMMERCIAL

## 2024-09-16 LAB — ANA SER QL IF: NEGATIVE

## 2024-09-16 NOTE — TELEPHONE ENCOUNTER
Order/Referral Request    Who is requesting: Patient    Orders being requested: n/a  Pt is unable to get in to see oncology until March 5. Is wondering if she should wait that long to be seen.    Reason service is needed/diagnosis: oncology    When are orders needed by: n/a     Has this been discussed with Provider: Yes    Does patient have a preference on a Group/Provider/Facility? [facility]?    Does patient have an appointment scheduled?: No    Where to send orders: N/A    Could we send this information to you in [employer] or would you prefer to receive a phone call?:   Patient would prefer a phone call   Okay to leave a detailed message?: Yes at Cell number on file:    Telephone Information:   Mobile [phone]

## 2024-09-16 NOTE — TELEPHONE ENCOUNTER
Order/Referral Request    Who is requesting: pt for ng physician obgyn    Orders being requested: ob/gyn - pt called and they had not received orders    Reason service is needed/diagnosis: ob/gyn    When are orders needed by: asap    Has this been discussed with Provider: Yes    Does patient have a preference on a Group/Provider/Facility?   [facility]   Fax [phone]    Does patient have an appointment scheduled?: No    Where to send orders: Fax    Could we send this information to you in [email or web address] or would you prefer to receive a phone call?:   Patient would prefer a phone call   Okay to leave a detailed message?: Yes at Home number on file [phone] (home)

## 2024-09-17 NOTE — TELEPHONE ENCOUNTER
I would recommend that she check at [facility] and [place]. Both have oncologists who come to the area and might have openings sooner. Please make sure she has phone numbers for both. The reason that she needs to establish care is because she was diagnosed with a hereditary condition called [facility].

## 2024-09-25 DIAGNOSIS — M25.562 CHRONIC PAIN OF BOTH KNEES: ICD-10-CM

## 2024-09-25 DIAGNOSIS — M25.561 CHRONIC PAIN OF BOTH KNEES: ICD-10-CM

## 2024-09-25 DIAGNOSIS — G89.29 CHRONIC PAIN OF BOTH KNEES: ICD-10-CM

## 2024-09-25 RX ORDER — DICLOFENAC POTASSIUM 50 MG/1
50 TABLET, FILM COATED ORAL 2 TIMES DAILY
Qty: 60 TABLET | Refills: 5 | Status: SHIPPED | OUTPATIENT
Start: 2024-09-25 | End: 2024-09-30

## 2024-09-25 RX ORDER — DICLOFENAC POTASSIUM 50 MG/1
50 TABLET, FILM COATED ORAL 2 TIMES DAILY
Qty: 60 TABLET | Refills: 0 | Status: SHIPPED | OUTPATIENT
Start: 2024-09-25 | End: 2024-09-25

## 2024-09-30 DIAGNOSIS — R23.2 HOT FLASHES: ICD-10-CM

## 2024-09-30 DIAGNOSIS — F51.01 PRIMARY INSOMNIA: ICD-10-CM

## 2024-09-30 RX ORDER — ESZOPICLONE 2 MG/1
2 TABLET, FILM COATED ORAL AT BEDTIME
Qty: 90 TABLET | Refills: 1 | Status: SHIPPED | OUTPATIENT
Start: 2024-09-30

## 2024-09-30 RX ORDER — DICLOFENAC POTASSIUM 50 MG/1
50 TABLET, FILM COATED ORAL 2 TIMES DAILY
Qty: 60 TABLET | Refills: 5 | Status: SHIPPED | OUTPATIENT
Start: 2024-09-30

## 2024-09-30 RX ORDER — ESTRADIOL 2 MG/1
2 TABLET ORAL DAILY
Qty: 90 TABLET | Refills: 3 | OUTPATIENT
Start: 2024-09-30

## 2024-09-30 NOTE — TELEPHONE ENCOUNTER
Medication Question or Refill    Contacts       Contact Date/Time Type Contact Phone/Fax    09/30/2024 09:16 AM CDT Phone (Incoming) Rupal Clarkee ANDREINA (Self) [phone] (M)     Medication refill requested            What medication are you calling about (include dose and sig)?: estradiol (ESTRACE) 2 MG tablet     eszopiclone (LUNESTA) 2 MG tablet       Preferred Pharmacy:   [id number] [address]  [address] [address]  Phone: [phone] Fax: [phone]      Controlled Substance Agreement on file:   CSA -- Patient Level:    CSA: None found at the patient level.       Who prescribed the medication?: Aaron Hernandez MD     Do you need a refill? Yes    When did you use the medication last? 9/29/24    Patient offered an appointment? No    Do you have any questions or concerns?  No      Could we send this information to you in [facility] or would you prefer to receive a phone call?:   Patient would prefer a phone call   Okay to leave a detailed message?: Yes at Cell number on file:    Telephone Information:   Mobile [phone]

## 2024-09-30 NOTE — TELEPHONE ENCOUNTER
09/30/2024    Pt called and stated [facility] pharmacy in [place] does not have the below Rx that was sent on 09/25/2024.    Pt is wondering if someone on the care team can call and give the pharmacy a verbal over the phone.    Diclofenac Potassium 50 mg Oral 2 TIMES DAILY     Mag Sorto

## 2024-10-01 NOTE — TELEPHONE ENCOUNTER
3:45 pm- incoming-Patient calling in because pharmacy still does not see the Diclofenac order and she really needs her medications.     3:53 pm-Outgoing call to pharmacy-  they said that her medications are under a different last name- and that is why it is being denied.     They said it was ready under Raissa Clarke and patient should be able to  the mediation.       4:05 pm- called patient and explained the issue that pharmacy told me. I advise that patient try and figure out if she get get all her medication under the same name so that this confusion does not continue.    I said that since at our clinic her name is under Raissa Clarke- that would be the best thing to do.     She is hoping she can get it figured out and is going to try and  her medications.     Nicole STUBBS RN

## 2024-10-13 DIAGNOSIS — K21.00 GASTROESOPHAGEAL REFLUX DISEASE WITH ESOPHAGITIS, UNSPECIFIED WHETHER HEMORRHAGE: ICD-10-CM

## 2024-10-14 RX ORDER — PANTOPRAZOLE SODIUM 20 MG/1
20 TABLET, DELAYED RELEASE ORAL DAILY
Qty: 90 TABLET | Refills: 1 | Status: SHIPPED | OUTPATIENT
Start: 2024-10-14

## 2024-10-23 DIAGNOSIS — F41.1 GENERALIZED ANXIETY DISORDER: Primary | ICD-10-CM

## 2024-10-23 RX ORDER — BUPROPION HYDROCHLORIDE 300 MG/1
300 TABLET ORAL EVERY MORNING
Qty: 90 TABLET | Refills: 0 | Status: SHIPPED | OUTPATIENT
Start: 2024-10-23

## 2024-10-23 NOTE — TELEPHONE ENCOUNTER
Medication Question or Refill    Contacts       Contact Date/Time Type Contact Phone/Fax    10/23/2024 12:08 PM CDT Phone (Incoming) Yolande Clarke (Self) [phone] (M)            What medication are you calling about (include dose and sig)?: buPROPion (WELLBUTRIN XL) 300 MG 24 hr tablet     Preferred Pharmacy:   [id number] [place], [place] - [address]  [address] [address]  Phone: [phone] Fax: [phone]      Controlled Substance Agreement on file:   CSA -- Patient Level:    CSA: None found at the patient level.       Who prescribed the medication?: PCP    Do you need a refill? Yes    When did you use the medication last? 10.23.24    Patient offered an appointment? No    Do you have any questions or concerns?  No      Could we send this information to you in [facility] or would you prefer to receive a phone call?:   Patient would prefer a phone call   Okay to leave a detailed message?: Yes at Cell number on file:    Telephone Information:   Mobile [phone]

## 2024-11-06 ENCOUNTER — OFFICE VISIT (OUTPATIENT)
Dept: FAMILY MEDICINE | Facility: CLINIC | Age: 59
End: 2024-11-06
Payer: COMMERCIAL

## 2024-11-06 VITALS
BODY MASS INDEX: 27.71 KG/M2 | DIASTOLIC BLOOD PRESSURE: 76 MMHG | WEIGHT: 156.4 LBS | SYSTOLIC BLOOD PRESSURE: 124 MMHG | HEIGHT: 63 IN | HEART RATE: 102 BPM | OXYGEN SATURATION: 97 % | RESPIRATION RATE: 12 BRPM

## 2024-11-06 DIAGNOSIS — N39.46 MIXED STRESS AND URGE URINARY INCONTINENCE: Primary | ICD-10-CM

## 2024-11-06 DIAGNOSIS — Z01.818 PREOP GENERAL PHYSICAL EXAM: ICD-10-CM

## 2024-11-06 PROBLEM — M06.051: Status: ACTIVE | Noted: 2024-10-01

## 2024-11-06 PROBLEM — M06.052: Status: ACTIVE | Noted: 2024-10-01

## 2024-11-06 PROBLEM — Z15.09: Status: ACTIVE | Noted: 2024-10-15

## 2024-11-06 PROBLEM — F33.1 MAJOR DEPRESSIVE DISORDER, RECURRENT EPISODE, MODERATE (H): Status: ACTIVE | Noted: 2024-11-06

## 2024-11-06 PROCEDURE — 85027 COMPLETE CBC AUTOMATED: CPT | Performed by: FAMILY MEDICINE

## 2024-11-06 PROCEDURE — 36415 COLL VENOUS BLD VENIPUNCTURE: CPT | Performed by: FAMILY MEDICINE

## 2024-11-06 PROCEDURE — 80048 BASIC METABOLIC PNL TOTAL CA: CPT | Performed by: FAMILY MEDICINE

## 2024-11-06 PROCEDURE — 99214 OFFICE O/P EST MOD 30 MIN: CPT | Performed by: FAMILY MEDICINE

## 2024-11-06 PROCEDURE — 93000 ELECTROCARDIOGRAM COMPLETE: CPT | Performed by: FAMILY MEDICINE

## 2024-11-06 NOTE — PATIENT INSTRUCTIONS
How to Take Your Medication Before Surgery  Preoperative Medication Instructions   Antiplatelet or Anticoagulation Medication Instructions   - aspirin: Discontinue aspirin 7-10 days prior to procedure to reduce bleeding risk. It should be resumed postoperatively.     Additional Medication Instructions  Take all scheduled medications on the day of surgery EXCEPT for modifications listed below:     - diclofenac (Voltaren): DO NOT TAKE 1 day before surgery.       Patient Education   Preparing for Your Surgery  For Adults  Getting started  In most cases, a nurse will call to review your health history and instructions. They will give you an arrival time based on your scheduled surgery time. Please be ready to share:  Your doctor's clinic name and phone number  Your medical, surgical, and anesthesia history  A list of allergies and sensitivities  A list of medicines, including herbal treatments and over-the-counter drugs  Whether the patient has a legal guardian (ask how to send us the papers in advance)  Note: You may not receive a call if you were seen at our PAC (Preoperative Assessment Center).  Please tell us if you're pregnant--or if there's any chance you might be pregnant. Some surgeries may injure a fetus (unborn baby), so they require a pregnancy test. Surgeries that are safe for a fetus don't always need a test, and you can choose whether to have one.   Preparing for surgery  Within 10 to 30 days of surgery: Have a pre-op exam (sometimes called an H&P, or History and Physical). This can be done at a clinic or pre-operative center.  If you're having a , you may not need this exam. Talk to your care team.  At your pre-op exam, talk to your care team about all medicines you take. (This includes CBD oil and any drugs, such as THC, marijuana, and other forms of cannabis.) If you need to stop any medicine before surgery, ask when to start taking it again.  This is for your safety. Many medicines and drugs  can make you bleed too much during surgery. Some change how well surgery (anesthesia) drugs work.  Call your insurance company to let them know you're having surgery. (If you don't have insurance, call [phone].)  Call your clinic if there's any change in your health. This includes a scrape or scratch near the surgery site, or any signs of a cold (sore throat, runny nose, cough, rash, fever).  Eating and drinking guidelines  For your safety: Unless your surgeon tells you otherwise, follow the guidelines below.  Eat and drink as normal until 8 hours before you arrive for surgery. After that, no food or milk. You can spit out gum when you arrive.  Drink clear liquids until 2 hours before you arrive. These are liquids you can see through, like water, Gatorade, and Propel Water. They also include plain black coffee and tea (no cream or milk).  No alcohol for 24 hours before you arrive. The night before surgery, stop any drinks that contain THC.  If your care team tells you to take medicine on the morning of surgery, it's okay to take it with a sip of water. No other medicines or drugs are allowed (including CBD oil)--follow your care team's instructions.  If you have questions the day of surgery, call your hospital or surgery center.   Preventing infection  Shower or bathe the night before and the morning of surgery. Follow the instructions your clinic gave you. (If no instructions, use regular soap.)  Don't shave or clip hair near your surgery site. We'll remove the hair if needed.  Don't smoke or vape the morning of surgery. No chewing tobacco for 6 hours before you arrive. A nicotine patch is okay. You may spit out nicotine gum when you arrive.  For some surgeries, the surgeon will tell you to fully quit smoking and nicotine.  We will make every effort to keep you safe from infection. We will:  Clean our hands often with soap and water (or an alcohol-based hand rub).  Clean the skin at your surgery site with a  special soap that kills germs.  Give you a special gown to keep you warm. (Cold raises the risk of infection.)  Wear hair covers, masks, gowns, and gloves during surgery.  Give antibiotic medicine, if prescribed. Not all surgeries need this medicine.  What to bring on the day of surgery  Photo ID and insurance card  Copy of your health care directive, if you have one  Glasses and hearing aids (bring cases)  You can't wear contacts during surgery  Inhaler and eye drops, if you use them (tell us about these when you arrive)  CPAP machine or breathing device, if you use them  A few personal items, if spending the night  If you have . . .  A pacemaker, ICD (cardiac defibrillator), or other implant: Bring the ID card.  An implanted stimulator: Bring the remote control.  A legal guardian: Bring a copy of the certified (court-stamped) guardianship papers.  Please remove any jewelry, including body piercings. Leave jewelry and other valuables at home.  If you're going home the day of surgery  You must have a responsible adult drive you home. They should stay with you overnight as well.  If you don't have someone to stay with you, and you aren't safe to go home alone, we may keep you overnight. Insurance often won't pay for this.  After surgery  If it's hard to control your pain or you need more pain medicine, please call your surgeon's office.  Questions?   If you have any questions for your care team, list them here:   ____________________________________________________________________________________________________________________________________________________________________________________________________________________________________________________________  For informational purposes only. Not to replace the advice of your health care provider. Copyright   2003, 2019 [facility]. All rights reserved. Clinically reviewed by Alexys Reddy MD. [employer] 408303 - REV 08/24.

## 2024-11-06 NOTE — PROGRESS NOTES
Preoperative Evaluation  [address] [address]  Phone: [phone]  Fax: [phone]  Primary Provider: Aaron Hernandez MD  Pre-op Performing Provider: Aaron Hernandez MD  Nov 6, 2024 11/1/2024   Surgical Information   What procedure is being done? PLACEMENT TRANSVAGINAL TAPE    Facility or Hospital where procedure/surgery will be performed: [facility]    Who is doing the procedure / surgery? Dr. Raymond Ceron    Date of surgery / procedure: Dec. 3, 2024    Time of surgery / procedure: 8:00 am    Where do you plan to recover after surgery? at home alone        Patient-reported     Fax number for surgical facility: Note does not need to be faxed, will be available electronically in Epic.    Assessment & Plan     The proposed surgical procedure is considered INTERMEDIATE risk.    Problem List Items Addressed This Visit       Mixed stress and urge urinary incontinence - Primary    Relevant Orders    EKG 12-lead complete w/read - Clinics (Completed)    Basic metabolic panel    CBC with platelets     Other Visit Diagnoses       Preop general physical exam                        - No identified additional risk factors other than previously addressed    Preoperative Medication Instructions  Antiplatelet or Anticoagulation Medication Instructions   - aspirin: Discontinue aspirin 7-10 days prior to procedure to reduce bleeding risk. It should be resumed postoperatively.     Additional Medication Instructions  Take all scheduled medications on the day of surgery EXCEPT for modifications listed below:     - diclofenac (Voltaren): DO NOT TAKE 1 day before surgery.    Recommendation  Approval given to proceed with proposed procedure, without further diagnostic evaluation.    Subjective   Yolande is a 59 year old, presenting for the following:  Pre-Op Exam          11/6/2024     5:06 PM   Additional Questions   Roomed by  Donna PADILLA CMA   Accompanied by None         11/6/2024   Forms   Any forms needing to be completed Yes      HPI related to upcoming procedure: patient with bladder symptoms, not improving, scheduled for transvaginal tape procedure        11/1/2024   Pre-Op Questionnaire   Have you ever had a heart attack or stroke? No    Have you ever had surgery on your heart or blood vessels, such as a stent placement, a coronary artery bypass, or surgery on an artery in your head, neck, heart, or legs? No    Do you have chest pain with activity? No    Do you have a history of heart failure? No    Do you currently have a cold, bronchitis or symptoms of other infection? No    Do you have a cough, shortness of breath, or wheezing? No    Do you or anyone in your family have previous history of blood clots? None for patient, family history unknown    Do you or does anyone in your family have a serious bleeding problem such as prolonged bleeding following surgeries or cuts? None for patient, family history unknown   Have you ever had problems with anemia or been told to take iron pills? (!) YES currently undergoing workup with GI    Have you had any abnormal blood loss such as black, tarry or bloody stools, or abnormal vaginal bleeding? No    Have you ever had a blood transfusion? No    Are you willing to have a blood transfusion if it is medically needed before, during, or after your surgery? Yes    Have you or any of your relatives ever had problems with anesthesia? None for patient, family history unknown   Do you have sleep apnea, excessive snoring or daytime drowsiness? No    Do you have any artifical heart valves or other implanted medical devices like a pacemaker, defibrillator, or continuous glucose monitor? No    Do you have artificial joints? No    Are you allergic to latex? (!) YES        Patient-reported     Health Care Directive  Patient does not have a Health Care Directive: Discussed advance care planning with patient;  information given to patient to review.    Preoperative Review of    reviewed - controlled substances reflected in medication list.          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 No pertinent past medical history.  Past Surgical History:   Procedure Laterality Date    HYSTERECTOMY, PAP NO LONGER INDICATED  1995    SALPINGO-OOPHORECTOMY BILATERAL  2013     Current Outpatient Medications   Medication Sig Dispense Refill    Ascorbic Acid (VITAMIN C) 500 MG CHEW Take 500 mg by mouth daily      aspirin 81 MG EC tablet Take 81 mg by mouth daily      atorvastatin (LIPITOR) 20 MG tablet Take 1 tablet (20 mg) by mouth daily. 90 tablet 1    buPROPion (WELLBUTRIN XL) 300 MG 24 hr tablet Take 1 tablet (300 mg) by mouth every morning. 90 tablet 0    diclofenac (CATAFLAM) 50 MG tablet Take 1 tablet (50 mg) by mouth 2 times daily. 60 tablet 5    diclofenac (VOLTAREN) 50 MG EC tablet Take 50 mg by mouth 2 times daily      DULoxetine (CYMBALTA) 60 MG capsule Take 1 capsule (60 mg) by mouth 2 times daily. 180 capsule 3    estradiol (ESTRACE) 2 MG tablet Take 1 tablet (2 mg) by mouth daily 90 tablet 3    eszopiclone (LUNESTA) 2 MG tablet Take 1 tablet (2 mg) by mouth at bedtime. 90 tablet 1    fluticasone (FLONASE) 50 MCG/ACT nasal  "spray Spray 2 sprays into both nostrils daily.      Multiple Vitamins-Minerals (WOMENS MULTIVITAMIN PO) Take 1 tablet by mouth daily      oxyBUTYnin ER (DITROPAN XL) 15 MG 24 hr tablet Take 1 tablet (15 mg) by mouth daily 90 tablet 4    pantoprazole (PROTONIX) 20 MG EC tablet TAKE 1 TABLET(20 MG) BY MOUTH DAILY 90 tablet 1       Allergies   Allergen Reactions    Tramadol Headache, Nausea, Nausea and Vomiting and GI Disturbance    Hydrocodone-Acetaminophen Itching    Latex Itching    Levofloxacin Nausea and Vomiting and GI Disturbance    Moxifloxacin Headache        Social History     Tobacco Use    Smoking status: Never     Passive exposure: Never    Smokeless tobacco: Never   Substance Use Topics    Alcohol use: Not on file       History   Drug Use Not on file             Review of Systems  CONSTITUTIONAL: NEGATIVE for fever, chills, change in weight  INTEGUMENTARY/SKIN: NEGATIVE for worrisome rashes, moles or lesions  EYES: NEGATIVE for vision changes or irritation  ENT/MOUTH: NEGATIVE for ear, mouth and throat problems  RESP:POSITIVE for cough-non productive and NEGATIVE for cough-productive, hemoptysis, and SOB/dyspnea  CV: NEGATIVE for chest pain, palpitations or peripheral edema  GI: NEGATIVE for nausea, abdominal pain, heartburn, or change in bowel habits  : NEGATIVE for frequency, dysuria, or hematuria  MUSCULOSKELETAL: NEGATIVE for significant arthralgias or myalgia  NEURO: NEGATIVE for weakness, dizziness or paresthesias  ENDOCRINE: NEGATIVE for temperature intolerance, skin/hair changes  HEME: NEGATIVE for bleeding problems  PSYCHIATRIC: NEGATIVE for changes in mood or affect    Objective    /76   Pulse 102   Resp 12   Ht 1.592 m (5' 2.68\")   Wt 70.9 kg (156 lb 6.4 oz)   LMP  (LMP Unknown)   SpO2 97%   BMI 27.99 kg/m     Estimated body mass index is 27.99 kg/m  as calculated from the following:    Height as of this encounter: 1.592 m (5' 2.68\").    Weight as of this encounter: 70.9 kg (156 " lb 6.4 oz).  Physical Exam  GENERAL: alert and no distress  EYES: Eyes grossly normal to inspection, PERRL and conjunctivae and sclerae normal  HENT: ear canals and TM's normal, nose and mouth without ulcers or lesions  NECK: no adenopathy, no asymmetry, masses, or scars  RESP: lungs clear to auscultation - no rales, rhonchi or wheezes  CV: regular rate and rhythm, normal S1 S2, no S3 or S4, no murmur, click or rub, no peripheral edema  ABDOMEN: soft, nontender, no hepatosplenomegaly, no masses and bowel sounds normal  MS: no gross musculoskeletal defects noted, no edema  SKIN: no suspicious lesions or rashes  NEURO: Normal strength and tone, mentation intact and speech normal  PSYCH: mentation appears normal, affect normal/bright    Recent Labs   Lab Test 09/12/24  1600   HGB 9.8*         POTASSIUM 4.0   CR 0.85        Diagnostics  Outside labs reviewed - hgb 11.8, WBC 10.7, normal platelets   Chem 14 in 9/2024 with slightly low calcium at 8.7 but otherwise normal  EKG: appears normal, NSR, normal axis, normal intervals, no acute ST/T changes c/w ischemia, no LVH by voltage criteria    Revised Cardiac Risk Index (RCRI)  The patient has the following serious cardiovascular risks for perioperative complications:   - No serious cardiac risks = 0 points     RCRI Interpretation: 0 points: Class I (very low risk - 0.4% complication rate)         Signed Electronically by: Aaron Hernandez MD  A copy of this evaluation report is provided to the requesting physician.

## 2024-11-07 LAB
ANION GAP SERPL CALCULATED.3IONS-SCNC: 11 MMOL/L (ref 7–15)
BUN SERPL-MCNC: 10.3 MG/DL (ref 8–23)
CALCIUM SERPL-MCNC: 9.3 MG/DL (ref 8.8–10.4)
CHLORIDE SERPL-SCNC: 103 MMOL/L (ref 98–107)
CREAT SERPL-MCNC: 0.78 MG/DL (ref 0.51–0.95)
EGFRCR SERPLBLD CKD-EPI 2021: 87 ML/MIN/1.73M2
ERYTHROCYTE [DISTWIDTH] IN BLOOD BY AUTOMATED COUNT: 23.5 % (ref 10–15)
GLUCOSE SERPL-MCNC: 74 MG/DL (ref 70–99)
HCO3 SERPL-SCNC: 27 MMOL/L (ref 22–29)
HCT VFR BLD AUTO: 42.1 % (ref 35–47)
HGB BLD-MCNC: 12.8 G/DL (ref 11.7–15.7)
MCH RBC QN AUTO: 27.2 PG (ref 26.5–33)
MCHC RBC AUTO-ENTMCNC: 30.4 G/DL (ref 31.5–36.5)
MCV RBC AUTO: 90 FL (ref 78–100)
PLATELET # BLD AUTO: 381 10E3/UL (ref 150–450)
POTASSIUM SERPL-SCNC: 4.1 MMOL/L (ref 3.4–5.3)
RBC # BLD AUTO: 4.7 10E6/UL (ref 3.8–5.2)
SODIUM SERPL-SCNC: 141 MMOL/L (ref 135–145)
WBC # BLD AUTO: 11.2 10E3/UL (ref 4–11)

## 2024-11-13 ENCOUNTER — MYC MEDICAL ADVICE (OUTPATIENT)
Dept: FAMILY MEDICINE | Facility: CLINIC | Age: 59
End: 2024-11-13
Payer: COMMERCIAL

## 2024-11-14 ENCOUNTER — TELEPHONE (OUTPATIENT)
Dept: FAMILY MEDICINE | Facility: CLINIC | Age: 59
End: 2024-11-14
Payer: COMMERCIAL

## 2024-11-14 NOTE — TELEPHONE ENCOUNTER
Patient scheduled for 11/25/24 with Dr. Hernandez. Concerns of mold exposure over the last year, no symptoms at time of call- no triage. Patient states that she feels like she is sick more frequently in the last year. Offered sooner appointments with other providers, patient would like to only schedule with Dr. Hernandez.

## 2024-11-14 NOTE — TELEPHONE ENCOUNTER
Reason for Call:  Appointment Request    Patient requesting this type of appt:  office visit or in person    Requested provider: Aaron Hernandez    Reason patient unable to be scheduled: Not within requested timeframe    When does patient want to be seen/preferred time:  as soon as possible     Comments: Patient would like to discuss mold exposure    Could we send this information to you in [employer] or would you prefer to receive a phone call?:   Patient would like to be contacted via [employer]    Call taken on 11/14/2024 at 9:41 AM by Maia Juarez

## 2024-11-18 ASSESSMENT — PATIENT HEALTH QUESTIONNAIRE - PHQ9
SUM OF ALL RESPONSES TO PHQ QUESTIONS 1-9: 22
SUM OF ALL RESPONSES TO PHQ QUESTIONS 1-9: 22
10. IF YOU CHECKED OFF ANY PROBLEMS, HOW DIFFICULT HAVE THESE PROBLEMS MADE IT FOR YOU TO DO YOUR WORK, TAKE CARE OF THINGS AT HOME, OR GET ALONG WITH OTHER PEOPLE: VERY DIFFICULT

## 2024-11-19 ENCOUNTER — OFFICE VISIT (OUTPATIENT)
Dept: FAMILY MEDICINE | Facility: CLINIC | Age: 59
End: 2024-11-19
Payer: COMMERCIAL

## 2024-11-19 VITALS
BODY MASS INDEX: 27.84 KG/M2 | OXYGEN SATURATION: 94 % | SYSTOLIC BLOOD PRESSURE: 127 MMHG | TEMPERATURE: 99 F | DIASTOLIC BLOOD PRESSURE: 77 MMHG | HEART RATE: 103 BPM | RESPIRATION RATE: 16 BRPM | HEIGHT: 63 IN | WEIGHT: 157.1 LBS

## 2024-11-19 DIAGNOSIS — J01.90 ACUTE SINUSITIS WITH SYMPTOMS > 10 DAYS: ICD-10-CM

## 2024-11-19 DIAGNOSIS — R09.81 CHRONIC NASAL CONGESTION: Primary | ICD-10-CM

## 2024-11-19 DIAGNOSIS — F33.1 MAJOR DEPRESSIVE DISORDER, RECURRENT EPISODE, MODERATE (H): ICD-10-CM

## 2024-11-19 DIAGNOSIS — Z23 NEED FOR VACCINATION: ICD-10-CM

## 2024-11-19 PROCEDURE — 90673 RIV3 VACCINE NO PRESERV IM: CPT | Performed by: NURSE PRACTITIONER

## 2024-11-19 PROCEDURE — 99214 OFFICE O/P EST MOD 30 MIN: CPT | Mod: 25 | Performed by: NURSE PRACTITIONER

## 2024-11-19 PROCEDURE — 90471 IMMUNIZATION ADMIN: CPT | Performed by: NURSE PRACTITIONER

## 2024-11-19 RX ORDER — FLUCONAZOLE 150 MG/1
150 TABLET ORAL ONCE
Qty: 1 TABLET | Refills: 0 | Status: SHIPPED | OUTPATIENT
Start: 2024-11-19 | End: 2024-11-19
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
## 2024-11-25 ENCOUNTER — TELEPHONE (OUTPATIENT)
Dept: FAMILY MEDICINE | Facility: CLINIC | Age: 59
End: 2024-11-25

## 2024-11-25 NOTE — TELEPHONE ENCOUNTER
S-(situation):   Patient calling for further treatment of symptoms.    B-(background):   OV: 11/19/2024 Chronic nasal congestion   Plan: Adult Allergy/Asthma  Referral        Advised continue neti pot, consider sudaphed and move out of basement    Acute sinusitis with symptoms > 10 days  Comment:   Plan: amoxicillin-clavulanate (AUGMENTIN) 875-125 MG         tablet, fluconazole (DIFLUCAN) 150 MG tablet    A-(assessment):   RN called and spoke with patient.   Patient has not taken the sudafed, and still lives in basement.  Symptoms the same.  Patient concerned as she is scheduled for surgery on Tuesday and does not want to postpone surgery (again).    R-(recommendations):   Patient is requesting alternative treatment for her sinus concerns.    Patient instructed to call back if symptoms change or if new symptoms present. Patient verbalizes agreement with plan.    Araceli RN, BSN  [place], [place]

## 2024-11-25 NOTE — TELEPHONE ENCOUNTER
FYI - Status Update    Who is Calling: patient    Update: Pt was seen about a week ago for a sinus infection. Pt was given medication, and she states that it is not working and she is not feeling any better. She is not currently having any new or worsening symptoms. She would like a call from her care team to see if there is anything else that can be tried.     Does caller want a call/response back: Yes     Could we send this information to you in [employer] or would you prefer to receive a phone call?:   Patient would prefer a phone call   Okay to leave a detailed message?: Yes at Cell number on file:    Telephone Information:   Mobile [phone]

## 2024-11-25 NOTE — TELEPHONE ENCOUNTER
Routing patient response to provider.   Last seen 11/19/24 - would you like pt to follow up with another provider in clinic tomorrow?

## 2024-11-25 NOTE — TELEPHONE ENCOUNTER
Spoke with patient.  She is currently on Augmentin.  We discussed symptomatic treatment.  Surgery is not until next Tuesday.  She will keep doing what she is doing and follow-up as needed.

## 2024-11-25 NOTE — TELEPHONE ENCOUNTER
Patient a voicemail that she can continue to try the over-the-counter treatment.  I do not think an alternate antibiotic is likely to make any difference.  She could use Afrin for 1 to 2 days to see if that decreased the congestion in her nose but noted that it should not be used for more than 3 days in a row.  If she has other questions I asked her to give us a call back.

## 2024-11-25 NOTE — TELEPHONE ENCOUNTER
11/25/2024    Pt returned call. TC relayed message. Pt understood message. Pt states she won't be able to have surgery if she is still congested.  Pt states she really needs this surgery on 12/03/2024 and doesn't  want to postpone it again.  Pt states she has tried using neti-pod & nasal spray and it isn't working. Pt would like to talk to PCP.     Mag Sorto

## 2024-12-09 ENCOUNTER — MYC MEDICAL ADVICE (OUTPATIENT)
Dept: FAMILY MEDICINE | Facility: CLINIC | Age: 59
End: 2024-12-09
Payer: COMMERCIAL

## 2024-12-10 ENCOUNTER — OFFICE VISIT (OUTPATIENT)
Dept: FAMILY MEDICINE | Facility: CLINIC | Age: 59
End: 2024-12-10
Payer: COMMERCIAL

## 2024-12-10 VITALS
DIASTOLIC BLOOD PRESSURE: 76 MMHG | OXYGEN SATURATION: 96 % | HEART RATE: 102 BPM | HEIGHT: 63 IN | RESPIRATION RATE: 20 BRPM | WEIGHT: 155 LBS | BODY MASS INDEX: 27.46 KG/M2 | TEMPERATURE: 97.6 F | SYSTOLIC BLOOD PRESSURE: 118 MMHG

## 2024-12-10 DIAGNOSIS — J32.0 CHRONIC MAXILLARY SINUSITIS: Primary | ICD-10-CM

## 2024-12-10 PROCEDURE — 99213 OFFICE O/P EST LOW 20 MIN: CPT

## 2024-12-10 RX ORDER — ACETAMINOPHEN AND CODEINE PHOSPHATE 300; 30 MG/1; MG/1
1 TABLET ORAL EVERY 6 HOURS PRN
COMMUNITY
Start: 2024-12-03

## 2024-12-10 RX ORDER — LORATADINE 10 MG/1
10 TABLET ORAL DAILY
COMMUNITY

## 2024-12-10 RX ORDER — DOXYCYCLINE 100 MG/1
100 CAPSULE ORAL 2 TIMES DAILY
Qty: 20 CAPSULE | Refills: 0 | Status: SHIPPED | OUTPATIENT
Start: 2024-12-10 | End: 2024-12-20

## 2024-12-10 RX ORDER — ACETAMINOPHEN 325 MG/1
2 TABLET ORAL EVERY 6 HOURS PRN
COMMUNITY
Start: 2024-12-03

## 2024-12-10 NOTE — PROGRESS NOTES
"  Assessment & Plan     Chronic maxillary sinusitis  Was treated in Nov with augmentin, had some improvement but not complete resolution.  Having headache, jaw and tooth pain, postnasal drip cough, and maxillary sinus pressure.  Will see allergist 1/30/24, if no causative allergy found discussed sinus CT and referral to ENT.  Patient uses Annel pot, fluticasone nasal spray as needed.  Limited Afrin use and discussed not using more than 3 days consecutively.  Will treat with doxycycline today to see if this helps to relieve symptoms.  - doxycycline hyclate (VIBRAMYCIN) 100 MG capsule; Take 1 capsule (100 mg) by mouth 2 times daily for 10 days.      BMI  Estimated body mass index is 27.74 kg/m  as calculated from the following:    Height as of this encounter: 1.592 m (5' 2.68\").    Weight as of this encounter: 70.3 kg (155 lb).             Urbano Lanier is a 59 year old, presenting for the following health issues:  Follow Up (Ongoing sinus congestion/pressure. HA, jaw pain and cough)      12/10/2024     8:30 AM   Additional Questions   Roomed by KAYLEIGH Alvarado   Accompanied by n/a     Coughing has been present since treatment mid-November.     Was in [place] from 2018 - 2023, did not have recurrent sinusitis while there.     Had bladder surgery 1 week ago. Is having spotting.     History of Present Illness       Headaches:   Since the patient's last clinic visit, headaches are: worsened  The patient is getting headaches:  4x  She is not able to do normal daily activities when she has a migraine.  The patient is taking the following rescue/relief medications:  Tylenol   Patient states \"The relief is inconsistent\" from the rescue/relief medications.   The patient is taking the following medications to prevent migraines:  No medications to prevent migraines  In the past 4 weeks, the patient has gone to an Urgent Care or Emergency Room 0 times times due to headaches.    Reason for visit:  Sinus issues continue    She eats " "0-1 servings of fruits and vegetables daily.She consumes 1 sweetened beverage(s) daily.She exercises with enough effort to increase her heart rate 10 to 19 minutes per day.  She exercises with enough effort to increase her heart rate 3 or less days per week.   She is taking medications regularly.                     Objective    /76 (BP Location: Right arm, Patient Position: Sitting)   Pulse 102   Temp 97.6  F (36.4  C) (Tympanic)   Resp 20   Ht 1.592 m (5' 2.68\")   Wt 70.3 kg (155 lb)   LMP  (LMP Unknown)   SpO2 96%   BMI 27.74 kg/m    Body mass index is 27.74 kg/m .  Physical Exam  HENT:      Head: Normocephalic.      Jaw: No tenderness or swelling.      Nose: Mucosal edema present. No nasal deformity or nasal tenderness.      Right Sinus: Maxillary sinus tenderness present.      Left Sinus: Maxillary sinus tenderness present.     Physical Exam  Constitutional:       Appearance: Normal appearance.   HENT:      Head: Normocephalic.      Mouth/Throat:      Mouth: Mucous membranes are moist.   Eyes:      Extraocular Movements: Extraocular movements intact.      Conjunctiva/sclera: Conjunctivae normal.   Cardiovascular:      Rate and Rhythm: Normal rate.   Pulmonary:      Effort: Pulmonary effort is normal. No respiratory distress.   Skin:     General: Skin is warm and dry.      Capillary Refill: Capillary refill takes less than 2 seconds.   Neurological:      Mental Status: She is alert and oriented to person, place, and time.   Psychiatric:         Behavior: Behavior normal.         Thought Content: Thought content normal.                   Signed Electronically by: OSIEL Parisi CNP    "

## 2024-12-30 ASSESSMENT — PATIENT HEALTH QUESTIONNAIRE - PHQ9
SUM OF ALL RESPONSES TO PHQ QUESTIONS 1-9: 19
SUM OF ALL RESPONSES TO PHQ QUESTIONS 1-9: 19
10. IF YOU CHECKED OFF ANY PROBLEMS, HOW DIFFICULT HAVE THESE PROBLEMS MADE IT FOR YOU TO DO YOUR WORK, TAKE CARE OF THINGS AT HOME, OR GET ALONG WITH OTHER PEOPLE: SOMEWHAT DIFFICULT

## 2024-12-31 ENCOUNTER — OFFICE VISIT (OUTPATIENT)
Dept: FAMILY MEDICINE | Facility: CLINIC | Age: 59
End: 2024-12-31
Payer: COMMERCIAL

## 2024-12-31 VITALS
TEMPERATURE: 98.3 F | DIASTOLIC BLOOD PRESSURE: 62 MMHG | OXYGEN SATURATION: 97 % | HEART RATE: 106 BPM | RESPIRATION RATE: 18 BRPM | SYSTOLIC BLOOD PRESSURE: 99 MMHG

## 2024-12-31 DIAGNOSIS — B96.89 ACUTE BACTERIAL RHINOSINUSITIS: ICD-10-CM

## 2024-12-31 DIAGNOSIS — F41.1 GENERALIZED ANXIETY DISORDER: ICD-10-CM

## 2024-12-31 DIAGNOSIS — J32.0 CHRONIC MAXILLARY SINUSITIS: Primary | ICD-10-CM

## 2024-12-31 DIAGNOSIS — F33.1 MAJOR DEPRESSIVE DISORDER, RECURRENT EPISODE, MODERATE (H): ICD-10-CM

## 2024-12-31 DIAGNOSIS — J01.90 ACUTE BACTERIAL RHINOSINUSITIS: ICD-10-CM

## 2024-12-31 RX ORDER — FLUCONAZOLE 150 MG/1
150 TABLET ORAL DAILY
COMMUNITY
Start: 2024-11-19 | End: 2024-12-31

## 2024-12-31 RX ORDER — FEXOFENADINE HCL AND PSEUDOEPHEDRINE HCL 180; 240 MG/1; MG/1
1 TABLET, EXTENDED RELEASE ORAL DAILY
COMMUNITY
Start: 2024-12-10

## 2024-12-31 RX ORDER — CEFDINIR 300 MG/1
300 CAPSULE ORAL 2 TIMES DAILY
Qty: 14 CAPSULE | Refills: 0 | Status: SHIPPED | OUTPATIENT
Start: 2024-12-31 | End: 2025-01-07

## 2024-12-31 RX ORDER — CLINDAMYCIN HYDROCHLORIDE 300 MG/1
300 CAPSULE ORAL EVERY 8 HOURS
Qty: 21 CAPSULE | Refills: 0 | Status: SHIPPED | OUTPATIENT
Start: 2024-12-31 | End: 2025-01-07

## 2024-12-31 ASSESSMENT — COLUMBIA-SUICIDE SEVERITY RATING SCALE - C-SSRS
6. HAVE YOU EVER DONE ANYTHING, STARTED TO DO ANYTHING, OR PREPARED TO DO ANYTHING TO END YOUR LIFE?: NO
2. IN THE PAST MONTH, HAVE YOU ACTUALLY HAD ANY THOUGHTS OF KILLING YOURSELF?: NO
1. WITHIN THE PAST MONTH, HAVE YOU WISHED YOU WERE DEAD OR WISHED YOU COULD GO TO SLEEP AND NOT WAKE UP?: YES

## 2024-12-31 ASSESSMENT — ENCOUNTER SYMPTOMS
SINUS PRESSURE: 1
COUGH: 1

## 2024-12-31 NOTE — Clinical Note
PATSY she reports mental health worsening in the setting of prolonged illness, weather/daily changes, recent anniversary.  No safety concerns, I will plan to check in with her in a week.

## 2024-12-31 NOTE — PATIENT INSTRUCTIONS
PLAN:     - 2 new antibiotics - one is every 8 hours for 7 days, the other is twice daily for 7 days - start today right away  - recommend bottled/filtered or boiled water for neti pot - make sure the neti pot is sanitized  - CT scan on the 14th or later   - Referral to ENT - [facility]

## 2024-12-31 NOTE — PROGRESS NOTES
Assessment & Plan     Acute bacterial rhinosinusitis  Chronic maxillary sinusitis  Presents with persistent symptoms despite antibiotic course as noted below.  Frontal sinus tenderness on exam.  She does meet criteria for Pseudomonas risk and therefore her Augmentin dosing was likely inadequate, though she did not get better with adequate dose of doxycycline.  Based on up-to-date algorithm, will pursue third-generation cephalosporin plus clindamycin.  Do recommend given her history of recurrent sinusitis that she see ENT and have a sinus CT once this illness resolves.  - cefdinir (OMNICEF) 300 MG capsule; Take 1 capsule (300 mg) by mouth 2 times daily for 7 days.  - clindamycin (CLEOCIN) 300 MG capsule; Take 1 capsule (300 mg) by mouth every 8 hours for 7 days.  - CT Sinus w/o Contrast; Future  - Adult ENT  Referral; Future    Major depressive disorder, recurrent episode, moderate (H)  Generalized anxiety disorder  Mental health acutely worse in the last week or two, in the setting of significant life changes, recent tough anniversary, prolonged illness, living in a basement, low daylight, cold weather.  She is adherent to her mental health medications including bupropion, duloxetine, and follows with her PCP.  No safety concerns at this time.  Brief counseling provided, with focus around getting out and getting light exposure/activity with these weather changes.  Will plan to check in in a week.       Depression Screening Follow Up        12/30/2024     6:26 PM   PHQ   PHQ-9 Total Score 19    Q9: Thoughts of better off dead/self-harm past 2 weeks More than half the days   F/U: Thoughts of suicide or self-harm No   F/U: Safety concerns No       Patient-reported         12/30/2024     6:26 PM   Last PHQ-9   1.  Little interest or pleasure in doing things 2   2.  Feeling down, depressed, or hopeless 2   3.  Trouble falling or staying asleep, or sleeping too much 2   4.  Feeling tired or having little energy  3   5.  Poor appetite or overeating 2   6.  Feeling bad about yourself 2   7.  Trouble concentrating 2   8.  Moving slowly or restless 2   Q9: Thoughts of better off dead/self-harm past 2 weeks 2   PHQ-9 Total Score 19    In the past two weeks have you had thoughts of suicide or self harm? No   Do you have concerns about your personal safety or the safety of others? No       Patient-reported             12/31/2024    12:51 PM   C-SSRS (Brief [place])   Within the last month, have you wished you were dead or wished you could go to sleep and not wake up? Yes   Within the last month, have you had any actual thoughts of killing yourself? No   Within the last month, have you ever done anything, started to do anything, or prepared to do anything to end your life? No           Subjective   Yolande is a 59 year old, presenting for the following health issues:  Sinus Problem (Sinus infection since the end of November.)        12/31/2024    11:09 AM   Additional Questions   Roomed by Bridgett-KAYLEIGH     History of Present Illness       Reason for visit:  Continue symptoms of sinus infection   She is taking medications regularly.    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  She is going through a troublesome divorce.  She lost her son many years ago and this stays with her.  PCP is aware of her current mental health state they are working on it.  Denies any history of suicidal  plan, just has passive thoughts occasionally.  No HI            Objective    BP 99/62   Pulse 106   Temp 98.3  F (36.8  C) (Tympanic)   Resp 18   LMP  (LMP Unknown)   SpO2 97%   There is no height or weight on file to calculate BMI.  Physical Exam  Constitutional:       General: She is not in acute distress.     Appearance: Normal appearance. She is not ill-appearing.   HENT:      Right Ear: Tympanic membrane, ear canal and external ear normal.      Left Ear: Tympanic membrane, ear canal and external ear normal.      Nose: Congestion present.      Right Sinus: Maxillary sinus tenderness present. No frontal sinus tenderness.      Left Sinus: Maxillary sinus tenderness present. No frontal sinus tenderness.      Mouth/Throat:      Mouth: Mucous membranes are moist.   Eyes:      Conjunctiva/sclera: Conjunctivae normal.   Cardiovascular:      Rate and Rhythm: Normal rate and regular rhythm.   Pulmonary:      Effort: Pulmonary effort is normal.      Breath sounds: Normal breath sounds. No wheezing, rhonchi or rales.   Skin:     General: Skin is warm.   Neurological:      General: No focal deficit present.      Mental Status: She is alert.   Psychiatric:         Mood and Affect: Mood is depressed.         Behavior: Behavior normal.                Signed Electronically by: Jyoti Rene MD

## 2025-01-07 ENCOUNTER — MYC MEDICAL ADVICE (OUTPATIENT)
Dept: FAMILY MEDICINE | Facility: CLINIC | Age: 60
End: 2025-01-07
Payer: COMMERCIAL

## 2025-01-16 DIAGNOSIS — J32.0 CHRONIC MAXILLARY SINUSITIS: Primary | ICD-10-CM

## 2025-01-16 RX ORDER — PREDNISONE 20 MG/1
TABLET ORAL
Qty: 15 TABLET | Refills: 0 | Status: SHIPPED | OUTPATIENT
Start: 2025-01-16 | End: 2025-01-26

## 2025-01-27 DIAGNOSIS — F41.1 GENERALIZED ANXIETY DISORDER: ICD-10-CM

## 2025-01-27 RX ORDER — BUPROPION HYDROCHLORIDE 300 MG/1
300 TABLET ORAL EVERY MORNING
Qty: 90 TABLET | Refills: 0 | Status: SHIPPED | OUTPATIENT
Start: 2025-01-27

## 2025-01-27 NOTE — TELEPHONE ENCOUNTER
Medication Question or Refill    Contacts       Contact Date/Time Type Contact Phone/Fax    01/27/2025 10:57 AM CST Phone (Incoming) Yolande Clarke (Self) [phone] (M)        What medication are you calling about (include dose and sig)?:     buPROPion (WELLBUTRIN XL) 300 MG 24 hr tablet     Preferred Pharmacy:     [address] [phone]     Controlled Substance Agreement on file:   CSA -- Patient Level:    CSA: None found at the patient level.     Who prescribed the medication?: Aaron Hernandez MD     Do you need a refill? Yes. Totally out.     Do you have any questions or concerns?  No    Could we send this information to you in [employer] or would you prefer to receive a phone call?:   Patient would prefer a phone call   Okay to leave a detailed message?: Yes at Home number on file [phone] (home)

## 2025-01-28 ENCOUNTER — LAB REQUISITION (OUTPATIENT)
Dept: LAB | Facility: CLINIC | Age: 60
End: 2025-01-28
Payer: COMMERCIAL

## 2025-01-28 DIAGNOSIS — J32.9 CHRONIC SINUSITIS, UNSPECIFIED: ICD-10-CM

## 2025-01-30 ENCOUNTER — OFFICE VISIT (OUTPATIENT)
Dept: ALLERGY | Facility: CLINIC | Age: 60
End: 2025-01-30
Attending: NURSE PRACTITIONER
Payer: COMMERCIAL

## 2025-01-30 VITALS — OXYGEN SATURATION: 95 % | WEIGHT: 154.7 LBS | BODY MASS INDEX: 27.68 KG/M2 | HEART RATE: 104 BPM

## 2025-01-30 DIAGNOSIS — R09.81 CHRONIC NASAL CONGESTION: ICD-10-CM

## 2025-01-30 DIAGNOSIS — J32.9 RECURRENT SINUS INFECTIONS: Primary | ICD-10-CM

## 2025-01-30 LAB
BACTERIA SPEC CULT: ABNORMAL
BACTERIA SPEC CULT: NORMAL

## 2025-01-30 NOTE — LETTER
1/30/2025      Raissa Clarke  [facility] [address] [facility] [id number]      Dear Colleague,    Thank you for referring your patient, Raissa Clarke, to the [facility]. Please see a copy of my visit note below.    Raissa Clarke was seen in the Allergy Clinic at [facility].    Raissa Clarke is a 59 year old female  being seen today for ongoing evaluation of chronic nasal congestion.  Referral from,     Sushma Blevins NP [facility] FP/IM/PEDS   History of Present Illness:     Recurrent sinus infection environmental allergy concern    Originally from [place].  She was living in [place],  ever since she moved back to [place] has been living in a basement she has had recurrent sinus infections.  Concerns for exposure to mold while in the basement.  Her daughter's friend which she has a roommate with also has a guinea pig.      Patient denies that outside of recurrent sinus infections she continuously has symptoms of rhinoconjunctivitis.  Will have stuffiness, and then leads to sinus infection.    There is no history of PE tubes, sinus surgeries, or tonsillectomy/adenoidectomy.  Patient reports she is currently seeing ENT, and undergoing sinus surgery soon for recurrent sinus infections    10/10/2014 4:07 PM CDT Immunoglobulin IgG 1,012 mg/dL    Immunoglobulin IgM 118 mg/dL     Patient denies that she has ever been evaluated for IgA deficiency.       Peq New Allergy And Asthma      Question 1/30/2025  9:59 AM CST - Filed by Patient   Reason for visit: Other   Please specify: allergies in general and if allergies to mold   Environmental and Social History    Place of Residence: House   Do you have Central Air Conditioning? Yes   Type of Heating System: Forced air   Wood burning stove or fireplace: No   Occupation: early childhood asst teacher   Pets: Yes   Number and type of pets:    Do you smoke cigarettes: No   Do you use an e-cigarette or vape? Yes   Does  anyone living in your home smoke or vape? Yes   Family History    Do your parents, siblings, or children have asthma? No   Do your parents, siblings, or children have environmental allergies? No   Do your parents, siblings, or children have food allergies? No   Do your parents, siblings, or children have eczema?      No past medical history on file.    Past Surgical History:   Procedure Laterality Date     HYSTERECTOMY, PAP NO LONGER INDICATED  1995     SALPINGO-OOPHORECTOMY BILATERAL  2013         Current Outpatient Medications:      acetaminophen (TYLENOL) 325 MG tablet, Take 2 tablets by mouth every 6 hours as needed., Disp: , Rfl:      Ascorbic Acid (VITAMIN C) 500 MG CHEW, Take 500 mg by mouth daily, Disp: , Rfl:      aspirin 81 MG EC tablet, Take 81 mg by mouth daily, Disp: , Rfl:      atorvastatin (LIPITOR) 20 MG tablet, Take 1 tablet (20 mg) by mouth daily., Disp: 90 tablet, Rfl: 1     buPROPion (WELLBUTRIN XL) 300 MG 24 hr tablet, Take 1 tablet (300 mg) by mouth every morning., Disp: 90 tablet, Rfl: 0     diclofenac (CATAFLAM) 50 MG tablet, Take 1 tablet (50 mg) by mouth 2 times daily., Disp: 60 tablet, Rfl: 5     DULoxetine (CYMBALTA) 60 MG capsule, Take 1 capsule (60 mg) by mouth 2 times daily., Disp: 180 capsule, Rfl: 3     estradiol (ESTRACE) 2 MG tablet, Take 1 tablet (2 mg) by mouth daily, Disp: 90 tablet, Rfl: 3     eszopiclone (LUNESTA) 2 MG tablet, Take 1 tablet (2 mg) by mouth at bedtime., Disp: 90 tablet, Rfl: 1     fexofenadine-pseudoePHEDrine (ALLEGRA-D 24) 180-240 MG 24 hr tablet, Take 1 tablet by mouth daily., Disp: , Rfl:      fluticasone (FLONASE) 50 MCG/ACT nasal spray, Spray 2 sprays into both nostrils daily., Disp: , Rfl:      Multiple Vitamins-Minerals (WOMENS MULTIVITAMIN PO), Take 1 tablet by mouth daily, Disp: , Rfl:      oxyBUTYnin ER (DITROPAN XL) 15 MG 24 hr tablet, Take 1 tablet (15 mg) by mouth daily, Disp: 90 tablet, Rfl: 4     pantoprazole (PROTONIX) 20 MG EC tablet, TAKE 1  TABLET(20 MG) BY MOUTH DAILY, Disp: 90 tablet, Rfl: 1  Allergies   Allergen Reactions     Tramadol Headache, Nausea, Nausea and Vomiting and GI Disturbance     Hydrocodone-Acetaminophen Itching     Latex Itching     Levofloxacin Nausea and Vomiting and GI Disturbance     Moxifloxacin Headache       Family History   Adopted: Yes   Family history unknown: Yes         EXAM:   LMP  (LMP Unknown)     Physical Exam  Constitutional:       General: She is not in acute distress.     Appearance: Normal appearance. She is not ill-appearing.   HENT:      Nose: Rhinorrhea present. No nasal deformity, septal deviation or congestion.      Right Turbinates: Not enlarged, swollen or pale.      Left Turbinates: Not enlarged, swollen or pale.      Mouth/Throat:      Mouth: Mucous membranes are moist.      Pharynx: Oropharynx is clear. Uvula midline. No posterior oropharyngeal erythema.      Tonsils: 0 on the right. 0 on the left.   Eyes:      General: No allergic shiner.        Right eye: No discharge.         Left eye: No discharge.      Conjunctiva/sclera: Conjunctivae normal.   Cardiovascular:      Rate and Rhythm: Normal rate and regular rhythm.      Heart sounds: Normal heart sounds, S1 normal and S2 normal.   Pulmonary:      Effort: Pulmonary effort is normal. No prolonged expiration.      Breath sounds: Normal breath sounds. No decreased air movement. No wheezing.   Neurological:      Mental Status: She is alert.   Psychiatric:         Mood and Affect: Mood normal.         Behavior: Behavior normal.         Judgment: Judgment normal.         WORKUP: Skin testing, appropriate positive and negative control       Percutaneous    Environmental Testing     Ordering Provider :  Cassia Serrano PA-C    Testing Technician : RYAN    Date: 1/30/2025      Time: 10:23 AM       (W/F in millimeters)    Allergen      Concentration : Manufacture     Sinan, white  1:20 H   0/0   Birch mix 1:20 H 0/0   [place], common 1:20 H 0/0   4. Elm,  American 1:20 H 0/0   5. [place], Shagbark 1:20 H 0/0   6. Maple, Hard/Sugar 1:20 H 0/0   7. [place] Mix 1:20 H 0/0   8. Oak, Red 1:20 H 0/0   9. [place], American 1:20 H 0/0   10. [place] Tree 1:20 H 0/0   11. Dp Mite 30,000 A U /ML H 0/0   12. Df Mite 30,000 A U /ML H 0/0   13. Grass Mix # 4 10,000 B A U /ML H 0/0   14. Abhinav grass 1:20 H 0/0   15.Cockroach mix 1:10 H 0/0   16. Alternaria Tenuis 1:10 H 0/0   17. Aspergillus Fumigatus 1:10 H 0/0   18. Homodendrum cladosporioides 1:10 H 0/0   19. Penicillium notatum 1:10 H 0/0   20. Epicoccum 1:10 H 0/0   21. Ragweed, Short 1:20 H 0/0   22. Dock, Sorrel 1:20 H 0/0   23. Lamb's Quarters 1:20 H 0/0   24. Pigweed, Rough Redroot 1:20 H 0/0   25. Plantain, English 1:20 H 0/0   26. Sagebrush, Mugwort 1:20 H 0/0   27. Cat 10,000  B A U /ML H 0/0   28. AP Dog 1:100 H 0/0   29. Neg. Control: 50% glycerine/saline H 0/0   30. Pos. Control: histamine 6mg/ML 5/15   31.  Guinea pig 0/0     At today's visit, the patient and I engaged in an informed consent discussion about allergy testing.  We discussed skin testing, blood testing, and the alternative of not undergoing any testing. The patient has a preference for skin testing. We then discussed the risks and benefits of skin testing.  The patient understands skin testing risks can include, but are not limited to, urticaria, angioedema, shortness of breath, and severe anaphylaxis.  The benefits include, but are not limited, to evaluation for allergens causing symptoms.  After answering the patient's questions they have agreed to proceed with skin testing.       IgA lab, recurrent sinus infection evaluation.  IgG/IgM were within normal limits 10-, does not warrant reevaluation of these immunoglobulins.    ASSESSMENT/PLAN:  Raissa Clarke is a 59 year old female with concerns for recurrent sinus infections.    Recurrent sinus infection  Patient reports she has sinus surgery scheduled with ENT in a few weeks.  Patient  reports she has a treatment plan with ENT for fluticasone nasal spray and nasal saline irrigation.    Negative environmental allergy testing performed today.    We will be in touch via MyChart with results of IgA laboratory evaluation, may need early use of antibiotics if she has an IgA deficiency.  We discussed approximately 4% of patients with recurrent sinus infections have an IgA immunodeficiency.    -Please continue care with ENT for further management of recurrent sinus infections.    Follow-up as needed.      Thank you for allowing me to participate in the care of Raissa Clarke.      I spent 45 minutes on the date of the encounter doing chart review, history and exam, documentation and further coordination as noted above exclusive of separately reported interpretations.     This excludes time spent for skin testing interpretation.     Please note that this note consists of symbols derived from keyboarding, dictation and/or voice recognition software. As a result, there may be errors in the script that have gone undetected. Please consider this when interpreting information found in this chart.     Cassia Serrano PA-C  [facility]      Again, thank you for allowing me to participate in the care of your patient.        Sincerely,        Cassia Serrano PA-C    Electronically signed

## 2025-01-30 NOTE — PROGRESS NOTES
Raissa Clarke was seen in the Allergy Clinic at [facility].    Raissa Clarke is a 59 year old female  being seen today for ongoing evaluation of chronic nasal congestion.  Referral from,     Sushma Blevins NP [facility] FP/IM/PEDS   History of Present Illness:     Recurrent sinus infection environmental allergy concern    Originally from [place].  She was living in [place],  ever since she moved back to [place] has been living in a basement she has had recurrent sinus infections.  Concerns for exposure to mold while in the basement.  Her daughter's friend which she has a roommate with also has a guinea pig.      Patient denies that outside of recurrent sinus infections she continuously has symptoms of rhinoconjunctivitis.  Will have stuffiness, and then leads to sinus infection.    There is no history of PE tubes, sinus surgeries, or tonsillectomy/adenoidectomy.  Patient reports she is currently seeing ENT, and undergoing sinus surgery soon for recurrent sinus infections    10/10/2014 4:07 PM CDT Immunoglobulin IgG 1,012 mg/dL    Immunoglobulin IgM 118 mg/dL     Patient denies that she has ever been evaluated for IgA deficiency.       Peq New Allergy And Asthma      Question 1/30/2025  9:59 AM CST - Filed by Patient   Reason for visit: Other   Please specify: allergies in general and if allergies to mold   Environmental and Social History    Place of Residence: House   Do you have Central Air Conditioning? Yes   Type of Heating System: Forced air   Wood burning stove or fireplace: No   Occupation: early childhood asst teacher   Pets: Yes   Number and type of pets:    Do you smoke cigarettes: No   Do you use an e-cigarette or vape? Yes   Does anyone living in your home smoke or vape? Yes   Family History    Do your parents, siblings, or children have asthma? No   Do your parents, siblings, or children have environmental allergies? No   Do your parents, siblings, or  children have food allergies? No   Do your parents, siblings, or children have eczema?      No past medical history on file.    Past Surgical History:   Procedure Laterality Date    HYSTERECTOMY, PAP NO LONGER INDICATED  1995    SALPINGO-OOPHORECTOMY BILATERAL  2013         Current Outpatient Medications:     acetaminophen (TYLENOL) 325 MG tablet, Take 2 tablets by mouth every 6 hours as needed., Disp: , Rfl:     Ascorbic Acid (VITAMIN C) 500 MG CHEW, Take 500 mg by mouth daily, Disp: , Rfl:     aspirin 81 MG EC tablet, Take 81 mg by mouth daily, Disp: , Rfl:     atorvastatin (LIPITOR) 20 MG tablet, Take 1 tablet (20 mg) by mouth daily., Disp: 90 tablet, Rfl: 1    buPROPion (WELLBUTRIN XL) 300 MG 24 hr tablet, Take 1 tablet (300 mg) by mouth every morning., Disp: 90 tablet, Rfl: 0    diclofenac (CATAFLAM) 50 MG tablet, Take 1 tablet (50 mg) by mouth 2 times daily., Disp: 60 tablet, Rfl: 5    DULoxetine (CYMBALTA) 60 MG capsule, Take 1 capsule (60 mg) by mouth 2 times daily., Disp: 180 capsule, Rfl: 3    estradiol (ESTRACE) 2 MG tablet, Take 1 tablet (2 mg) by mouth daily, Disp: 90 tablet, Rfl: 3    eszopiclone (LUNESTA) 2 MG tablet, Take 1 tablet (2 mg) by mouth at bedtime., Disp: 90 tablet, Rfl: 1    fexofenadine-pseudoePHEDrine (ALLEGRA-D 24) 180-240 MG 24 hr tablet, Take 1 tablet by mouth daily., Disp: , Rfl:     fluticasone (FLONASE) 50 MCG/ACT nasal spray, Spray 2 sprays into both nostrils daily., Disp: , Rfl:     Multiple Vitamins-Minerals (WOMENS MULTIVITAMIN PO), Take 1 tablet by mouth daily, Disp: , Rfl:     oxyBUTYnin ER (DITROPAN XL) 15 MG 24 hr tablet, Take 1 tablet (15 mg) by mouth daily, Disp: 90 tablet, Rfl: 4    pantoprazole (PROTONIX) 20 MG EC tablet, TAKE 1 TABLET(20 MG) BY MOUTH DAILY, Disp: 90 tablet, Rfl: 1  Allergies   Allergen Reactions    Tramadol Headache, Nausea, Nausea and Vomiting and GI Disturbance    Hydrocodone-Acetaminophen Itching    Latex Itching    Levofloxacin Nausea and Vomiting  and GI Disturbance    Moxifloxacin Headache       Family History   Adopted: Yes   Family history unknown: Yes         EXAM:   LMP  (LMP Unknown)     Physical Exam  Constitutional:       General: She is not in acute distress.     Appearance: Normal appearance. She is not ill-appearing.   HENT:      Nose: Rhinorrhea present. No nasal deformity, septal deviation or congestion.      Right Turbinates: Not enlarged, swollen or pale.      Left Turbinates: Not enlarged, swollen or pale.      Mouth/Throat:      Mouth: Mucous membranes are moist.      Pharynx: Oropharynx is clear. Uvula midline. No posterior oropharyngeal erythema.      Tonsils: 0 on the right. 0 on the left.   Eyes:      General: No allergic shiner.        Right eye: No discharge.         Left eye: No discharge.      Conjunctiva/sclera: Conjunctivae normal.   Cardiovascular:      Rate and Rhythm: Normal rate and regular rhythm.      Heart sounds: Normal heart sounds, S1 normal and S2 normal.   Pulmonary:      Effort: Pulmonary effort is normal. No prolonged expiration.      Breath sounds: Normal breath sounds. No decreased air movement. No wheezing.   Neurological:      Mental Status: She is alert.   Psychiatric:         Mood and Affect: Mood normal.         Behavior: Behavior normal.         Judgment: Judgment normal.         WORKUP: Skin testing, appropriate positive and negative control       Percutaneous    Environmental Testing     Ordering Provider :  Cassia Serrano PA-C    Testing Technician : RYAN    Date: 1/30/2025      Time: 10:23 AM       (W/F in millimeters)    Allergen      Concentration : Manufacture     Sinan, white  1:20 H   0/0   Birch mix 1:20 H 0/0   [place], common 1:20 H 0/0   4. Elm, American 1:20 H 0/0   5. [place], Shagbark 1:20 H 0/0   6. Maple, Hard/Sugar 1:20 H 0/0   7. [place] Mix 1:20 H 0/0   8. Oak, Red 1:20 H 0/0   9. [place], American 1:20 H 0/0   10. [place] Tree 1:20 H 0/0   11. Dp Mite 30,000 A U /ML H 0/0   12. Df Mite  30,000 A U /ML H 0/0   13. Grass Mix # 4 10,000 B A U /ML H 0/0   14. Abhinav grass 1:20 H 0/0   15.Cockroach mix 1:10 H 0/0   16. Alternaria Tenuis 1:10 H 0/0   17. Aspergillus Fumigatus 1:10 H 0/0   18. Homodendrum cladosporioides 1:10 H 0/0   19. Penicillium notatum 1:10 H 0/0   20. Epicoccum 1:10 H 0/0   21. Ragweed, Short 1:20 H 0/0   22. Dock, Sorrel 1:20 H 0/0   23. Lamb's Quarters 1:20 H 0/0   24. Pigweed, Rough Redroot 1:20 H 0/0   25. Plantain, English 1:20 H 0/0   26. Sagebrush, Mugwort 1:20 H 0/0   27. Cat 10,000  B A U /ML H 0/0   28. AP Dog 1:100 H 0/0   29. Neg. Control: 50% glycerine/saline H 0/0   30. Pos. Control: histamine 6mg/ML 5/15   31.  Guinea pig 0/0     At today's visit, the patient and I engaged in an informed consent discussion about allergy testing.  We discussed skin testing, blood testing, and the alternative of not undergoing any testing. The patient has a preference for skin testing. We then discussed the risks and benefits of skin testing.  The patient understands skin testing risks can include, but are not limited to, urticaria, angioedema, shortness of breath, and severe anaphylaxis.  The benefits include, but are not limited, to evaluation for allergens causing symptoms.  After answering the patient's questions they have agreed to proceed with skin testing.       IgA lab, recurrent sinus infection evaluation.  IgG/IgM were within normal limits 10-, does not warrant reevaluation of these immunoglobulins.    ASSESSMENT/PLAN:  Raissa Clarke is a 59 year old female with concerns for recurrent sinus infections.    Recurrent sinus infection  Patient reports she has sinus surgery scheduled with ENT in a few weeks.  Patient reports she has a treatment plan with ENT for fluticasone nasal spray and nasal saline irrigation.    Negative environmental allergy testing performed today.    We will be in touch via MyChart with results of IgA laboratory evaluation, may need early use of  antibiotics if she has an IgA deficiency.  We discussed approximately 4% of patients with recurrent sinus infections have an IgA immunodeficiency.    -Please continue care with ENT for further management of recurrent sinus infections.    Follow-up as needed.      Thank you for allowing me to participate in the care of Raissa ANDREINA Clarke.      I spent 45 minutes on the date of the encounter doing chart review, history and exam, documentation and further coordination as noted above exclusive of separately reported interpretations.     This excludes time spent for skin testing interpretation.     Please note that this note consists of symbols derived from keyboarding, dictation and/or voice recognition software. As a result, there may be errors in the script that have gone undetected. Please consider this when interpreting information found in this chart.     Cassia Serrano PA-C  [facility]

## 2025-02-06 LAB — BACTERIA SPEC CULT: NORMAL

## 2025-02-12 LAB — BACTERIA SPEC CULT: NO GROWTH
